# Patient Record
Sex: MALE | Race: WHITE | URBAN - METROPOLITAN AREA
[De-identification: names, ages, dates, MRNs, and addresses within clinical notes are randomized per-mention and may not be internally consistent; named-entity substitution may affect disease eponyms.]

---

## 2017-01-06 ENCOUNTER — OFFICE VISIT (OUTPATIENT)
Dept: FAMILY MEDICINE CLINIC | Age: 52
End: 2017-01-06

## 2017-01-06 VITALS — HEIGHT: 69 IN

## 2017-01-06 DIAGNOSIS — E29.1 HYPOGONADISM MALE: Primary | ICD-10-CM

## 2017-01-06 RX ORDER — TESTOSTERONE CYPIONATE 200 MG/ML
400 INJECTION INTRAMUSCULAR ONCE
Qty: 2 ML | Refills: 0
Start: 2017-01-06 | End: 2017-01-06

## 2017-01-06 NOTE — PROGRESS NOTES
Patient presents for Testosterone Injection 2 ml, 400 mg. Injection was given in Right Gluteus by Isatu Jones LPN and tolerated without difficulty. Patient advised to schedule next injection. Patient in agreement.

## 2017-02-03 ENCOUNTER — OFFICE VISIT (OUTPATIENT)
Dept: FAMILY MEDICINE CLINIC | Age: 52
End: 2017-02-03

## 2017-02-03 VITALS — HEIGHT: 69 IN

## 2017-02-03 DIAGNOSIS — E29.1 HYPOGONADISM MALE: Primary | ICD-10-CM

## 2017-02-03 RX ORDER — TESTOSTERONE CYPIONATE 200 MG/ML
400 INJECTION INTRAMUSCULAR ONCE
Qty: 2 ML | Refills: 0
Start: 2017-02-03 | End: 2017-02-03

## 2017-02-03 NOTE — PROGRESS NOTES
Patient presents for Testosterone Injection 2 ml, 400 mg. Injection was given in Left Gluteus by Woo Martienz LPN and tolerated without difficulty. Patient advised to schedule next injection. Patient in agreement.

## 2017-03-03 ENCOUNTER — CLINICAL SUPPORT (OUTPATIENT)
Dept: FAMILY MEDICINE CLINIC | Age: 52
End: 2017-03-03

## 2017-03-03 VITALS — HEIGHT: 69 IN

## 2017-03-03 DIAGNOSIS — E29.1 HYPOGONADISM MALE: Primary | ICD-10-CM

## 2017-03-03 RX ORDER — TESTOSTERONE CYPIONATE 200 MG/ML
400 INJECTION INTRAMUSCULAR ONCE
Qty: 2 ML | Refills: 0
Start: 2017-03-03 | End: 2017-03-03

## 2017-03-03 NOTE — PROGRESS NOTES
Patient presents for Testosterone Injection 2 ml, 400 mg. Injection was given in Right Gluteus by Benjamin Quintana LPN and tolerated without difficulty. Patient advised to schedule next injection. Patient in agreement.

## 2017-04-06 ENCOUNTER — CLINICAL SUPPORT (OUTPATIENT)
Dept: FAMILY MEDICINE CLINIC | Age: 52
End: 2017-04-06

## 2017-04-06 VITALS — HEIGHT: 69 IN

## 2017-04-06 DIAGNOSIS — E29.1 HYPOGONADISM MALE: Primary | ICD-10-CM

## 2017-04-06 RX ORDER — TESTOSTERONE CYPIONATE 200 MG/ML
400 INJECTION INTRAMUSCULAR ONCE
Qty: 2 ML | Refills: 0
Start: 2017-04-06 | End: 2017-04-06

## 2017-04-06 NOTE — PROGRESS NOTES
Patient presents for Testosterone Injection 2 ml, 400 mg. Injection was given in Right Gluteus by Pavel Colunga LPN and tolerated without difficulty. Patient advised to schedule next injection. Patient in agreement.

## 2017-04-11 RX ORDER — CANAGLIFLOZIN 300 MG/1
TABLET, FILM COATED ORAL
Qty: 30 TAB | Refills: 5 | Status: SHIPPED | OUTPATIENT
Start: 2017-04-11 | End: 2017-10-02 | Stop reason: SDUPTHER

## 2017-05-05 ENCOUNTER — CLINICAL SUPPORT (OUTPATIENT)
Dept: FAMILY MEDICINE CLINIC | Age: 52
End: 2017-05-05

## 2017-05-05 VITALS — HEIGHT: 69 IN

## 2017-05-05 DIAGNOSIS — E29.1 HYPOGONADISM MALE: Primary | ICD-10-CM

## 2017-05-05 RX ORDER — TESTOSTERONE CYPIONATE 200 MG/ML
400 INJECTION INTRAMUSCULAR ONCE
Qty: 2 ML | Refills: 0
Start: 2017-05-05 | End: 2017-05-05

## 2017-05-05 NOTE — PROGRESS NOTES
Patient presents for Testosterone Injection 2 ml, 400 mg. Injection was given in Left Gluteus by Henrry Richardson LPN and tolerated without difficulty. Patient advised to schedule next injection. Patient in agreement.

## 2017-06-02 ENCOUNTER — OFFICE VISIT (OUTPATIENT)
Dept: FAMILY MEDICINE CLINIC | Age: 52
End: 2017-06-02

## 2017-06-02 VITALS — HEIGHT: 69 IN

## 2017-06-02 DIAGNOSIS — E29.1 HYPOGONADISM MALE: Primary | ICD-10-CM

## 2017-06-02 RX ORDER — TESTOSTERONE CYPIONATE 200 MG/ML
400 INJECTION INTRAMUSCULAR ONCE
Qty: 2 ML | Refills: 0
Start: 2017-06-02 | End: 2017-06-02

## 2017-06-02 NOTE — PROGRESS NOTES
Patient presents for Testosterone Injection 2 ml, 400 mg. Injection was given in Right Gluteus by Ariel Burgess LPN and tolerated without difficulty. Patient advised to schedule next injection. Patient in agreement.

## 2017-07-03 RX ORDER — METFORMIN HYDROCHLORIDE 500 MG/1
TABLET, EXTENDED RELEASE ORAL
Qty: 60 TAB | Refills: 5 | Status: SHIPPED | OUTPATIENT
Start: 2017-07-03 | End: 2017-12-30 | Stop reason: SDUPTHER

## 2017-07-07 ENCOUNTER — OFFICE VISIT (OUTPATIENT)
Dept: FAMILY MEDICINE CLINIC | Age: 52
End: 2017-07-07

## 2017-07-07 VITALS — HEIGHT: 69 IN

## 2017-07-07 DIAGNOSIS — E29.1 HYPOGONADISM MALE: Primary | ICD-10-CM

## 2017-07-07 RX ORDER — TESTOSTERONE CYPIONATE 200 MG/ML
400 INJECTION INTRAMUSCULAR ONCE
Qty: 2 ML | Refills: 0
Start: 2017-07-07 | End: 2017-07-07

## 2017-07-07 NOTE — PROGRESS NOTES
Patient presents for Testosterone Injection 2 ml, 400 mg. Injection was given in Left Gluteus by Jeffrey Bustos LPN and tolerated without difficulty. Patient advised to schedule next injection. Patient in agreement.

## 2017-08-04 ENCOUNTER — OFFICE VISIT (OUTPATIENT)
Dept: FAMILY MEDICINE CLINIC | Age: 52
End: 2017-08-04

## 2017-08-04 VITALS
HEART RATE: 58 BPM | OXYGEN SATURATION: 98 % | HEIGHT: 69 IN | BODY MASS INDEX: 35.25 KG/M2 | SYSTOLIC BLOOD PRESSURE: 120 MMHG | DIASTOLIC BLOOD PRESSURE: 80 MMHG | RESPIRATION RATE: 16 BRPM | WEIGHT: 238 LBS | TEMPERATURE: 98 F

## 2017-08-04 DIAGNOSIS — E29.1 HYPOGONADISM MALE: ICD-10-CM

## 2017-08-04 DIAGNOSIS — E78.1 HYPERTRIGLYCERIDEMIA: ICD-10-CM

## 2017-08-04 DIAGNOSIS — E11.9 TYPE 2 DIABETES MELLITUS WITHOUT COMPLICATION, WITHOUT LONG-TERM CURRENT USE OF INSULIN (HCC): Primary | ICD-10-CM

## 2017-08-04 RX ORDER — TESTOSTERONE CYPIONATE 200 MG/ML
400 INJECTION INTRAMUSCULAR ONCE
Qty: 2 ML | Refills: 0
Start: 2017-08-04 | End: 2017-08-04

## 2017-08-04 RX ORDER — CLOBETASOL PROPIONATE 0.5 MG/G
CREAM TOPICAL 2 TIMES DAILY
Qty: 60 G | Refills: 5 | Status: SHIPPED | OUTPATIENT
Start: 2017-08-04 | End: 2018-08-14 | Stop reason: SDUPTHER

## 2017-08-04 NOTE — PROGRESS NOTES
1. Have you been to the ER, urgent care clinic since your last visit? Hospitalized since your last visit? No    2. Have you seen or consulted any other health care providers outside of the [address] [place] [address] since your last visit? Include any pap smears or colon screening. No    Chief Complaint   Patient presents with    Immunization/Injection     testosterone    Labs    Follow-up         Patient presents for Testosterone Injection 2 ml, 400 mg. Injection was given in Right Gluteus by Mabel Torres LPN and tolerated without difficulty. Patient advised to schedule next injection. Patient in agreement.

## 2017-08-04 NOTE — PROGRESS NOTES
HISTORY OF PRESENT ILLNESS  Chpee Ray is a 47 y.o. male. HPI  Cardiovascular Review:  The patient has hyperlipidemia. Diet and Lifestyle: generally follows a low fat low cholesterol diet, generally follows a low sodium diet, sedentary, nonsmoker  Home BP Monitoring: is not measured at home. Pertinent ROS: taking medications as instructed, no medication side effects noted, no TIA's, no chest pain on exertion, no dyspnea on exertion, no swelling of ankles. Diabetes Mellitus:  He has diabetes mellitus, and  hyperlipidemia. Diabetic ROS - medication compliance: compliant all of the time, diabetic diet compliance: compliant all of the time, home glucose monitoring: is not performed. Lab review: orders written for new lab studies as appropriate; see orders. Hypogonadism:  Due to 400mg IM shot today, also due for labs for testosterone        Patient Active Problem List    Diagnosis Date Noted    Diabetes mellitus ([facility] [address] 04/03/2012    Hypertriglyceridemia 04/03/2012    Finger(s) amputation status 05/13/2010    MOOK (obstructive sleep apnea) 05/13/2010    Hypogonadism male 05/13/2010     Current Outpatient Prescriptions   Medication Sig Dispense Refill    clobetasol (TEMOVATE) 0.05 % topical cream Apply  to affected area two (2) times a day. 60 g 5    testosterone cypionate (DEPOTESTOTERONE CYPIONATE) 200 mg/mL injection 2 mL by IntraMUSCular route once for 1 dose. 2 mL 0    metFORMIN ER (GLUCOPHAGE XR) 500 mg tablet TAKE 2 TABLETS BY MOUTH DAILY WITH DINNER 60 Tab 5    INVOKANA 300 mg tablet TAKE 1 TABLET BY MOUTH EVERY DAY 30 Tab 5    fenofibrate nanocrystallized (TRICOR) 145 mg tablet TAKE 1 TABLET BY MOUTH EVERY DAY 30 Tab 5    glucose blood VI test strips (ASCENSIA AUTODISC VI, ONE TOUCH ULTRA TEST VI) strip Bid monitoring dx: 250.00 1 Package 11    Lancets misc Bid monitoring dx: 250.00 1 Package 11    Blood-Glucose Meter (ONE TOUCH ULTRA SYSTEM KIT) monitoring kit by Does Not Apply route. Dx: 250.00   1 Kit 0     Family History   Problem Relation Age of Onset    No Known Problems Mother      Social History   Substance Use Topics    Smoking status: Former Smoker     Years: 10.00    Smokeless tobacco: Never Used    Alcohol use 0.0 oz/week     0 Standard drinks or equivalent per week      Comment: occas       ROS  A comprehensive review of system was obtained and negative except findings in the HPI    Visit Vitals    /80    Pulse (!) 58    Temp 98 °F (36.7 °C) (Oral)    Resp 16    Ht 5' 9\" (1.753 m)    Wt 238 lb (108 kg)    SpO2 98%    BMI 35.15 kg/m2     Physical Exam   Constitutional: He is oriented to person, place, and time. He appears well-developed and well-nourished. No distress. Cardiovascular: Normal rate and regular rhythm. No murmur heard. Pulmonary/Chest: Breath sounds normal. No respiratory distress. He has no wheezes. Musculoskeletal: He exhibits no edema. Neurological: He is alert and oriented to person, place, and time. Nursing note and vitals reviewed. ASSESSMENT and PLAN  Encounter Diagnoses   Name Primary?  Type 2 diabetes mellitus without complication, without long-term current use of insulin (HCC) Yes    Hypertriglyceridemia     Hypogonadism male      Orders Placed This Encounter    HEMOGLOBIN A1C WITH EAG    LIPID PANEL    TESTOSTERONE, FREE & TOTAL    clobetasol (TEMOVATE) 0.05 % topical cream    testosterone cypionate (DEPOTESTOTERONE CYPIONATE) 200 mg/mL injection     Labs and refills updated  Testosterone 400mg IM today  I have discussed the diagnosis with the patient and the intended plan as seen in the above orders. The patient has received an after-visit summary and questions were answered concerning future plans. Patient conveyed understanding of the plan at the time of the visit.     Meredith Isbell, MSN, ANP  8/4/2017

## 2017-08-04 NOTE — LETTER
8/4/2017 Mr. Chino Torres [address] [address] [place] [address] Patient is an active member of this practice. He is currently followed for Type II Diabetes, Hypertriglyceridemia, and Hypogonadism.   
 
 
 
Sincerely, 
 
 
Virgil Dave, NP

## 2017-08-04 NOTE — MR AVS SNAPSHOT
Visit Information Date & Time Provider Department Dept. Phone Encounter #  
 8/4/2017  8:45 AM Sarah Alegria, NP [address] [facility] [phone] [id number] Your Appointments 9/1/2017  7:45 AM  
ESTABLISHED PATIENT with Sarah Alegria, NP [address] [facility] ([address]) Appt Note: [id number] [place] [address] Road [address] [phone]  
  
   
 [address] [address] Upcoming Health Maintenance Date Due Hepatitis C Screening 1965 FOOT EXAM Q1 4/10/1974 EYE EXAM RETINAL OR DILATED Q1 4/10/1974 Pneumococcal 19-64 Medium Risk (1 of 1 - PPSV23) 4/10/1983 DTaP/Tdap/Td series (1 - Tdap) 4/10/1985 COLONOSCOPY 4/20/2015 HEMOGLOBIN A1C Q6M 6/2/2017 INFLUENZA AGE 9 TO ADULT 8/1/2017 MICROALBUMIN Q1 12/2/2017 LIPID PANEL Q1 12/2/2017 Allergies as of 8/4/2017  Review Complete On: 8/4/2017 By: Lj Yates LPN No Known Allergies Current Immunizations  Reviewed on 11/3/2015 Name Date Influenza Vaccine 12/13/2013 Influenza Vaccine (Quad) PF 11/4/2016, 11/3/2015, 1/2/2015 Influenza Vaccine Split 11/6/2012, 12/5/2011, 12/1/2010 Not reviewed this visit You Were Diagnosed With   
  
 Codes Comments Type 2 diabetes mellitus without complication, without long-term current use of insulin (HCC)    -  Primary ICD-10-CM: E11.9 ICD-9-CM: 250.00 Hypertriglyceridemia     ICD-10-CM: E78.1 ICD-9-CM: 272.1 Hypogonadism male     ICD-10-CM: E29.1 ICD-9-CM: 257.2 Vitals BP Pulse Temp Resp Height(growth percentile) Weight(growth percentile) 120/80 (!) 58 98 °F (36.7 °C) (Oral) 16 5' 9\" (1.753 m) 238 lb (108 kg) SpO2 BMI Smoking Status 98% 35.15 kg/m2 Former Smoker Vitals History BMI and BSA Data Body Mass Index Body Surface Area  
 35.15 kg/m 2 2.29 m 2 Preferred Pharmacy Pharmacy Name Phone [facility]/PHARMACY #[id number]- [address] [phone] Your Updated Medication List  
  
   
This list is accurate as of: 8/4/17  9:38 AM.  Always use your most recent med list.  
  
  
  
  
 Blood-Glucose Meter monitoring kit Commonly known as:  ONETOUCH ULTRA SYSTEM KIT  
by Does Not Apply route. Dx: 250.00  
  
 clobetasol 0.05 % topical cream  
Commonly known as:  Starling Coffee Apply  to affected area two (2) times a day. fenofibrate nanocrystallized 145 mg tablet Commonly known as:  TRICOR  
TAKE 1 TABLET BY MOUTH EVERY DAY  
  
 glucose blood VI test strips strip Commonly known as:  ASCENSIA AUTODISC VI, ONE TOUCH ULTRA TEST VI Bid monitoring dx: 250.00 INVOKANA 300 mg tablet Generic drug:  canagliflozin TAKE 1 TABLET BY MOUTH EVERY DAY Lancets Misc Bid monitoring dx: 250.00  
  
 metFORMIN  mg tablet Commonly known as:  GLUCOPHAGE XR  
TAKE 2 TABLETS BY MOUTH DAILY WITH DINNER  
  
 testosterone cypionate 200 mg/mL injection Commonly known as:  DEPOTESTOTERONE CYPIONATE 2 mL by IntraMUSCular route once for 1 dose. Prescriptions Sent to Pharmacy Refills  
 clobetasol (TEMOVATE) 0.05 % topical cream 5 Sig: Apply  to affected area two (2) times a day. Class: Normal  
 Pharmacy: [address] [place] [address], [address] [place] [address] Ph #: [phone] Route: Topical  
  
We Performed the Following HEMOGLOBIN A1C WITH EAG [87649 CPT(R)] LIPID PANEL [99247 CPT(R)] [place] INJ TESTOSTERONE CYPIONATE [ [facility]] [place] THER/PROPH/DIAG INJECTION, SUBCUT/IM [id number] CPT(R)] TESTOSTERONE, FREE & TOTAL [38457 CPT(R)] Introducing [facility]! Dear Masha Palenciar: 
Thank you for requesting a Ch[employer] account. Our records indicate that you already have an active [employer] account. You can access your account anytime at [email or web address] Did you know that you can access your hospital and ER discharge instructions at any time in OneBuild? You can also review all of your test results from your hospital stay or ER visit. Additional Information If you have questions, please visit the Frequently Asked Questions section of the OneBuild website at [email or web address] Remember, OneBuild is NOT to be used for urgent needs. For medical emergencies, dial 911. Now available from your iPhone and Android! Please provide this summary of care documentation to your next provider. Your primary care clinician is listed as Jess Alonso. If you have any questions after today's visit, please call [phone].

## 2017-08-05 LAB
CHOLEST SERPL-MCNC: 187 MG/DL (ref 100–199)
EST. AVERAGE GLUCOSE BLD GHB EST-MCNC: 163 MG/DL
HBA1C MFR BLD: 7.3 % (ref 4.8–5.6)
HDLC SERPL-MCNC: 31 MG/DL
INTERPRETATION, 910389: NORMAL
LDLC SERPL CALC-MCNC: 93 MG/DL (ref 0–99)
Lab: NORMAL
TESTOST FREE SERPL-MCNC: 6.1 PG/ML (ref 7.2–24)
TESTOST SERPL-MCNC: 173 NG/DL (ref 264–916)
TRIGL SERPL-MCNC: 313 MG/DL (ref 0–149)
VLDLC SERPL CALC-MCNC: 63 MG/DL (ref 5–40)

## 2017-08-06 NOTE — PROGRESS NOTES
Hey there, your labs for sugar are starting to climb again to make sure you are active and really watching the diet for carbs/sweets. Your testosterone level remains low but unfortunately this can see/saw in the shots. Your checkup is due anytime after 12/2/17, so we will do labs again then fasting.  Ricardo Joyner

## 2017-08-24 RX ORDER — FENOFIBRATE 145 MG/1
TABLET, COATED ORAL
Qty: 30 TAB | Refills: 1 | Status: SHIPPED | OUTPATIENT
Start: 2017-08-24 | End: 2017-10-22 | Stop reason: SDUPTHER

## 2017-09-01 ENCOUNTER — OFFICE VISIT (OUTPATIENT)
Dept: FAMILY MEDICINE CLINIC | Age: 52
End: 2017-09-01

## 2017-09-01 VITALS — HEIGHT: 69 IN

## 2017-09-01 DIAGNOSIS — E29.1 HYPOGONADISM MALE: Primary | ICD-10-CM

## 2017-09-01 RX ORDER — TESTOSTERONE CYPIONATE 200 MG/ML
200 INJECTION INTRAMUSCULAR ONCE
Qty: 2 ML | Refills: 0
Start: 2017-09-01 | End: 2017-09-01

## 2017-09-01 NOTE — PROGRESS NOTES
Patient presents for Testosterone Injection 2 ml, 400 mg. Injection was given in Left Gluteus by Baltazaralexandre Carranza LPN and tolerated without difficulty. Patient advised to schedule next injection. Patient in agreement.

## 2017-10-02 RX ORDER — CANAGLIFLOZIN 300 MG/1
TABLET, FILM COATED ORAL
Qty: 30 TAB | Refills: 5 | Status: SHIPPED | OUTPATIENT
Start: 2017-10-02 | End: 2018-04-15 | Stop reason: SDUPTHER

## 2017-10-06 ENCOUNTER — CLINICAL SUPPORT (OUTPATIENT)
Dept: FAMILY MEDICINE CLINIC | Age: 52
End: 2017-10-06

## 2017-10-06 DIAGNOSIS — Z23 ENCOUNTER FOR IMMUNIZATION: ICD-10-CM

## 2017-10-06 DIAGNOSIS — E29.1 HYPOGONADISM MALE: Primary | ICD-10-CM

## 2017-10-06 RX ORDER — TESTOSTERONE CYPIONATE 200 MG/ML
400 INJECTION INTRAMUSCULAR ONCE
Qty: 2 ML | Refills: 0
Start: 2017-10-06 | End: 2017-10-06

## 2017-10-22 RX ORDER — FENOFIBRATE 145 MG/1
TABLET, COATED ORAL
Qty: 30 TAB | Refills: 1 | Status: SHIPPED | OUTPATIENT
Start: 2017-10-22 | End: 2017-12-04 | Stop reason: SDUPTHER

## 2017-11-03 ENCOUNTER — CLINICAL SUPPORT (OUTPATIENT)
Dept: FAMILY MEDICINE CLINIC | Age: 52
End: 2017-11-03

## 2017-11-03 VITALS — HEIGHT: 69 IN

## 2017-11-03 DIAGNOSIS — E29.1 HYPOGONADISM MALE: Primary | ICD-10-CM

## 2017-11-03 RX ORDER — TESTOSTERONE CYPIONATE 200 MG/ML
400 INJECTION INTRAMUSCULAR ONCE
Qty: 2 ML | Refills: 0
Start: 2017-11-03 | End: 2017-11-03

## 2017-11-03 NOTE — PROGRESS NOTES
Patient presents for Testosterone Injection 2 ml, 400 mg. Injection was given in Left Gluteus by Nakia Roca LPN and tolerated without difficulty. Patient advised to schedule next injection. Patient in agreement.

## 2017-12-04 ENCOUNTER — OFFICE VISIT (OUTPATIENT)
Dept: FAMILY MEDICINE CLINIC | Age: 52
End: 2017-12-04

## 2017-12-04 VITALS
SYSTOLIC BLOOD PRESSURE: 110 MMHG | RESPIRATION RATE: 16 BRPM | HEIGHT: 69 IN | WEIGHT: 235.13 LBS | OXYGEN SATURATION: 98 % | DIASTOLIC BLOOD PRESSURE: 80 MMHG | HEART RATE: 68 BPM | BODY MASS INDEX: 34.82 KG/M2 | TEMPERATURE: 97.7 F

## 2017-12-04 DIAGNOSIS — Z12.5 SCREENING PSA (PROSTATE SPECIFIC ANTIGEN): ICD-10-CM

## 2017-12-04 DIAGNOSIS — Z00.00 WELL ADULT EXAM: Primary | ICD-10-CM

## 2017-12-04 DIAGNOSIS — E29.1 HYPOGONADISM MALE: ICD-10-CM

## 2017-12-04 DIAGNOSIS — E11.9 TYPE 2 DIABETES MELLITUS WITHOUT COMPLICATION, WITHOUT LONG-TERM CURRENT USE OF INSULIN (HCC): ICD-10-CM

## 2017-12-04 DIAGNOSIS — E78.1 HYPERTRIGLYCERIDEMIA: ICD-10-CM

## 2017-12-04 RX ORDER — FENOFIBRATE 145 MG/1
TABLET, COATED ORAL
Qty: 30 TAB | Refills: 5 | Status: SHIPPED | OUTPATIENT
Start: 2017-12-04 | End: 2018-06-29 | Stop reason: SDUPTHER

## 2017-12-04 RX ORDER — TESTOSTERONE CYPIONATE 200 MG/ML
400 INJECTION INTRAMUSCULAR ONCE
Qty: 2 ML | Refills: 0
Start: 2017-12-04 | End: 2017-12-04

## 2017-12-04 NOTE — PROGRESS NOTES
1. Have you been to the ER, urgent care clinic since your last visit? Hospitalized since your last visit? No    2. Have you seen or consulted any other health care providers outside of the [address] [place] [address] since your last visit? Include any pap smears or colon screening. No    Chief Complaint   Patient presents with    Immunization/Injection     testosterone    Follow-up     4 mo f/u    Labs     fasting         Patient presents for Testosterone Injection 2 ml, 200 mg. Injection was given in Right Gluteus by Courtney Cortez LPN and tolerated without difficulty. Patient advised to schedule next injection. Patient in agreement.

## 2017-12-04 NOTE — PROGRESS NOTES
Subjective:   Little Arce is a 47 y.o. male presenting for his annual checkup. ROS:  Feeling well. No dyspnea or chest pain on exertion. No abdominal pain, change in bowel habits, black or bloody stools. No urinary tract or prostatic symptoms. No neurological complaints. Specific concerns today: due for fasting labs, needs testosterone injection for the month. Patient Active Problem List    Diagnosis Date Noted    Diabetes mellitus ([address] 04/03/2012    Hypertriglyceridemia 04/03/2012    Finger(s) amputation status 05/13/2010    MOOK (obstructive sleep apnea) 05/13/2010    Hypogonadism male 05/13/2010     Current Outpatient Prescriptions   Medication Sig Dispense Refill    fenofibrate nanocrystallized (TRICOR) 145 mg tablet TAKE 1 TABLET BY MOUTH EVERY DAY 30 Tab 5    testosterone cypionate (DEPOTESTOTERONE CYPIONATE) 200 mg/mL injection 2 mL by IntraMUSCular route once for 1 dose. 2 mL 0    varicella zoster vaccine live (ZOSTAVAX) 19,400 unit/0.65 mL susr injection 1 Vial by SubCUTAneous route once for 1 dose. 0.65 mL 0    INVOKANA 300 mg tablet TAKE 1 TABLET BY MOUTH EVERY DAY 30 Tab 5    clobetasol (TEMOVATE) 0.05 % topical cream Apply  to affected area two (2) times a day. 60 g 5    metFORMIN ER (GLUCOPHAGE XR) 500 mg tablet TAKE 2 TABLETS BY MOUTH DAILY WITH DINNER 60 Tab 5    glucose blood VI test strips (ASCENSIA AUTODISC VI, ONE TOUCH ULTRA TEST VI) strip Bid monitoring dx: 250.00 1 Package 11    Lancets misc Bid monitoring dx: 250.00 1 Package 11    Blood-Glucose Meter (ONE TOUCH ULTRA SYSTEM KIT) monitoring kit by Does Not Apply route.  Dx: 250.00   1 Kit 0     Family History   Problem Relation Age of Onset    No Known Problems Mother      Social History   Substance Use Topics    Smoking status: Former Smoker     Years: 10.00    Smokeless tobacco: Never Used    Alcohol use 0.0 oz/week     0 Standard drinks or equivalent per week      Comment: occas             Objective: Visit Vitals    /80    Pulse 68    Temp 97.7 °F (36.5 °C) (Oral)    Resp 16    Ht 5' 9\" (1.753 m)    Wt 235 lb 2 oz (106.7 kg)    SpO2 98%    BMI 34.72 kg/m2     The patient appears well, alert, oriented x 3, in no distress. ENT normal.  Neck supple. No adenopathy or thyromegaly. DEVON. Lungs are clear, good air entry, no wheezes, rhonchi or rales. S1 and S2 normal, no murmurs, regular rate and rhythm. Abdomen is soft without tenderness, guarding, mass or organomegaly.  exam: HERNIA EXAM: no hernias found on exam.  Extremities show no edema, normal peripheral pulses. Neurological is normal without focal findings. Assessment/Plan:   healthy adult male  lose weight, increase physical activity, follow low fat diet, follow low salt diet, routine labs ordered. Encounter Diagnoses   Name Primary?  Well adult exam Yes    Type 2 diabetes mellitus without complication, without long-term current use of insulin (HCC)     Hypertriglyceridemia     Hypogonadism male     Screening PSA (prostate specific antigen)      Orders Placed This Encounter    CBC WITH AUTOMATED DIFF    LIPID PANEL    METABOLIC PANEL, COMPREHENSIVE    HEMOGLOBIN A1C WITH EAG    MICROALBUMIN, UR, RAND W/ MICROALBUMIN/CREA RATIO    TESTOSTERONE, FREE & TOTAL    PROSTATE SPECIFIC AG    fenofibrate nanocrystallized (TRICOR) 145 mg tablet    testosterone cypionate (DEPOTESTOTERONE CYPIONATE) 200 mg/mL injection    varicella zoster vaccine live (ZOSTAVAX) 19,400 unit/0.65 mL susr injection   . All labs updated today  rx for zoster given to price out at pharmacy  Testosterone injection 400mg IM given  Recheck 1 mo  I have discussed the diagnosis with the patient and the intended plan as seen in the above orders. The patient has received an after-visit summary and questions were answered concerning future plans. Patient conveyed understanding of the plan at the time of the visit.     Linn Steinerelor, MSN, ANP 12/4/2017

## 2017-12-04 NOTE — MR AVS SNAPSHOT
Visit Information Date & Time Provider Department Dept. Phone Encounter #  
 12/4/2017  9:30 AM Edwin Spivey NP [facility] [phone] [id number] Your Appointments 1/5/2018  8:15 AM  
ESTABLISHED PATIENT with Edwin Spivey NP [facility] ([facility]) Appt Note: injection [address] [address] [address] [id number] [phone]  
  
   
 [address] [address] [address] [address] Upcoming Health Maintenance Date Due Hepatitis C Screening 1965 FOOT EXAM Q1 4/10/1974 EYE EXAM RETINAL OR DILATED Q1 4/10/1974 Pneumococcal 19-64 Medium Risk (1 of 1 - PPSV23) 4/10/1983 DTaP/Tdap/Td series (1 - Tdap) 4/10/1985 COLONOSCOPY 4/20/2015 MICROALBUMIN Q1 12/2/2017 HEMOGLOBIN A1C Q6M 2/4/2018 LIPID PANEL Q1 8/4/2018 Allergies as of 12/4/2017  Review Complete On: 12/4/2017 By: Diane Veras LPN No Known Allergies Current Immunizations  Reviewed on 11/3/2015 Name Date Influenza Vaccine 12/13/2013 Influenza Vaccine Christopher Lobstein) 10/6/2017 Influenza Vaccine (Quad) PF 11/4/2016, 11/3/2015, 1/2/2015 Influenza Vaccine Split 11/6/2012, 12/5/2011, 12/1/2010 Not reviewed this visit You Were Diagnosed With   
  
 Codes Comments Well adult exam    -  Primary ICD-10-CM: Z00.00 ICD-9-CM: V70.0 Type 2 diabetes mellitus without complication, without long-term current use of insulin (HCC)     ICD-10-CM: E11.9 ICD-9-CM: 250.00 Hypertriglyceridemia     ICD-10-CM: E78.1 ICD-9-CM: 272.1 Hypogonadism male     ICD-10-CM: E29.1 ICD-9-CM: 257.2 Screening PSA (prostate specific antigen)     ICD-10-CM: Z12.5 ICD-9-CM: V76.44 Vitals BP Pulse Temp Resp Height(growth percentile) Weight(growth percentile) 110/80 68 97.7 °F (36.5 °C) (Oral) 16 5' 9\" (1.753 m) 235 lb 2 oz (106.7 kg) SpO2 BMI Smoking Status 98% 34.72 kg/m2 Former Smoker Vitals History BMI and BSA Data Body Mass Index Body Surface Area 34.72 kg/m 2 2.28 m 2 Preferred Pharmacy Pharmacy Name Phone [facility]/PHARMACY #[id number]- [id number] [phone] Your Updated Medication List  
  
   
This list is accurate as of: 17  9:50 AM.  Always use your most recent med list.  
  
  
  
  
 Blood-Glucose Meter monitoring kit Commonly known as:  ONETOUCH ULTRA SYSTEM KIT  
by Does Not Apply route. Dx: 250.00  
  
 clobetasol 0.05 % topical cream  
Commonly known as:  Izetta Gonzalez Apply  to affected area two (2) times a day. fenofibrate nanocrystallized 145 mg tablet Commonly known as:  TRICOR  
TAKE 1 TABLET BY MOUTH EVERY DAY  
  
 glucose blood VI test strips strip Commonly known as:  ASCENSIA AUTODISC VI, ONE TOUCH ULTRA TEST VI Bid monitoring dx: 250.00 INVOKANA 300 mg tablet Generic drug:  canagliflozin TAKE 1 TABLET BY MOUTH EVERY DAY Lancets Misc Bid monitoring dx: 250.00  
  
 metFORMIN  mg tablet Commonly known as:  GLUCOPHAGE XR  
TAKE 2 TABLETS BY MOUTH DAILY WITH DINNER  
  
 testosterone cypionate 200 mg/mL injection Commonly known as:  DEPOTESTOTERONE CYPIONATE 2 mL by IntraMUSCular route once for 1 dose. varicella zoster vaccine live 19,400 unit/0.65 mL Susr injection Commonly known as:  ZOSTAVAX  
1 Vial by SubCUTAneous route once for 1 dose. Prescriptions Printed Refills  
 varicella zoster vaccine live (ZOSTAVAX) 19,400 unit/0.65 mL susr injection 0 Si Vial by SubCUTAneous route once for 1 dose. Class: Print Route: SubCUTAneous Prescriptions Sent to Pharmacy Refills  
 fenofibrate nanocrystallized (TRICOR) 145 mg tablet 5 Sig: TAKE 1 TABLET BY MOUTH EVERY DAY Class: Normal  
 Pharmacy: [facility], [address] [place] [address] Ph #: [phone] We Performed the Following CBC WITH AUTOMATED DIFF [71546 CPT(R)] HEMOGLOBIN A1C WITH EAG [71372 CPT(R)] LIPID PANEL [73473 CPT(R)] METABOLIC PANEL, COMPREHENSIVE [14444 CPT(R)] MICROALBUMIN, UR, RAND W/ MICROALBUMIN/CREA RATIO [id number] CPT(R)] SD INJ TESTOSTERONE CYPIONATE [ [facility]] [place] THER/PROPH/DIAG INJECTION, SUBCUT/IM [id number] CPT(R)] PSA, DIAGNOSTIC (PROSTATE SPECIFIC AG) [id number] CPT(R)] TESTOSTERONE, FREE & TOTAL [25025 CPT(R)] Introducing [facility]! Dear Ishaan Party: 
Thank you for requesting a [employer] account. Our records indicate that you already have an active [employer] account. You can access your account anytime at [email or web address] Did you know that you can access your hospital and ER discharge instructions at any time in [employer]? You can also review all of your test results from your hospital stay or ER visit. Additional Information If you have questions, please visit the Frequently Asked Questions section of the [employer] website at [email or web address] Remember, [employer] is NOT to be used for urgent needs. For medical emergencies, dial 911. Now available from your iPhone and Android! Please provide this summary of care documentation to your next provider. Your primary care clinician is listed as Zay Bettencourt. If you have any questions after today's visit, please call [phone].

## 2017-12-05 LAB
ALBUMIN SERPL-MCNC: 4.5 G/DL (ref 3.5–5.5)
ALBUMIN/CREAT UR: 6.3 MG/G CREAT (ref 0–30)
ALBUMIN/GLOB SERPL: 1.4 {RATIO} (ref 1.2–2.2)
ALP SERPL-CCNC: 102 IU/L (ref 39–117)
ALT SERPL-CCNC: 31 IU/L (ref 0–44)
AST SERPL-CCNC: 27 IU/L (ref 0–40)
BASOPHILS # BLD AUTO: 0 X10E3/UL (ref 0–0.2)
BASOPHILS NFR BLD AUTO: 1 %
BILIRUB SERPL-MCNC: 0.4 MG/DL (ref 0–1.2)
BUN SERPL-MCNC: 14 MG/DL (ref 6–24)
BUN/CREAT SERPL: 14 (ref 9–20)
CALCIUM SERPL-MCNC: 9.5 MG/DL (ref 8.7–10.2)
CHLORIDE SERPL-SCNC: 99 MMOL/L (ref 96–106)
CHOLEST SERPL-MCNC: 215 MG/DL (ref 100–199)
CO2 SERPL-SCNC: 26 MMOL/L (ref 18–29)
CREAT SERPL-MCNC: 0.99 MG/DL (ref 0.76–1.27)
CREAT UR-MCNC: 70.9 MG/DL
EOSINOPHIL # BLD AUTO: 0.1 X10E3/UL (ref 0–0.4)
EOSINOPHIL NFR BLD AUTO: 3 %
ERYTHROCYTE [DISTWIDTH] IN BLOOD BY AUTOMATED COUNT: 13.6 % (ref 12.3–15.4)
EST. AVERAGE GLUCOSE BLD GHB EST-MCNC: 186 MG/DL
GFR SERPLBLD CREATININE-BSD FMLA CKD-EPI: 100 ML/MIN/1.73
GFR SERPLBLD CREATININE-BSD FMLA CKD-EPI: 87 ML/MIN/1.73
GLOBULIN SER CALC-MCNC: 3.2 G/DL (ref 1.5–4.5)
GLUCOSE SERPL-MCNC: 138 MG/DL (ref 65–99)
HBA1C MFR BLD: 8.1 % (ref 4.8–5.6)
HCT VFR BLD AUTO: 51 % (ref 37.5–51)
HDLC SERPL-MCNC: 38 MG/DL
HGB BLD-MCNC: 17.5 G/DL (ref 13–17.7)
IMM GRANULOCYTES # BLD: 0 X10E3/UL (ref 0–0.1)
IMM GRANULOCYTES NFR BLD: 1 %
INTERPRETATION, 910389: NORMAL
LDLC SERPL CALC-MCNC: 132 MG/DL (ref 0–99)
LYMPHOCYTES # BLD AUTO: 1.4 X10E3/UL (ref 0.7–3.1)
LYMPHOCYTES NFR BLD AUTO: 29 %
Lab: NORMAL
MCH RBC QN AUTO: 30.5 PG (ref 26.6–33)
MCHC RBC AUTO-ENTMCNC: 34.3 G/DL (ref 31.5–35.7)
MCV RBC AUTO: 89 FL (ref 79–97)
MICROALBUMIN UR-MCNC: 4.5 UG/ML
MONOCYTES # BLD AUTO: 0.5 X10E3/UL (ref 0.1–0.9)
MONOCYTES NFR BLD AUTO: 10 %
NEUTROPHILS # BLD AUTO: 2.8 X10E3/UL (ref 1.4–7)
NEUTROPHILS NFR BLD AUTO: 56 %
PLATELET # BLD AUTO: 218 X10E3/UL (ref 150–379)
POTASSIUM SERPL-SCNC: 4.8 MMOL/L (ref 3.5–5.2)
PROT SERPL-MCNC: 7.7 G/DL (ref 6–8.5)
PSA SERPL-MCNC: 0.7 NG/ML (ref 0–4)
RBC # BLD AUTO: 5.74 X10E6/UL (ref 4.14–5.8)
SODIUM SERPL-SCNC: 140 MMOL/L (ref 134–144)
TESTOST FREE SERPL-MCNC: 6.9 PG/ML (ref 7.2–24)
TESTOST SERPL-MCNC: 126 NG/DL (ref 264–916)
TRIGL SERPL-MCNC: 224 MG/DL (ref 0–149)
VLDLC SERPL CALC-MCNC: 45 MG/DL (ref 5–40)
WBC # BLD AUTO: 4.9 X10E3/UL (ref 3.4–10.8)

## 2017-12-11 NOTE — PROGRESS NOTES
Both your sugar and cholesterol have gone sharita high again, are you taking your meds?  Deo Ellsworth

## 2018-01-02 RX ORDER — METFORMIN HYDROCHLORIDE 500 MG/1
TABLET, EXTENDED RELEASE ORAL
Qty: 60 TAB | Refills: 5 | Status: SHIPPED | OUTPATIENT
Start: 2018-01-02 | End: 2018-06-29 | Stop reason: SDUPTHER

## 2018-01-05 ENCOUNTER — OFFICE VISIT (OUTPATIENT)
Dept: FAMILY MEDICINE CLINIC | Age: 53
End: 2018-01-05

## 2018-01-05 VITALS — HEIGHT: 69 IN

## 2018-01-05 DIAGNOSIS — E29.1 HYPOGONADISM MALE: Primary | ICD-10-CM

## 2018-01-05 RX ORDER — TESTOSTERONE CYPIONATE 200 MG/ML
400 INJECTION INTRAMUSCULAR ONCE
Qty: 2 ML | Refills: 0
Start: 2018-01-05 | End: 2018-01-05

## 2018-01-05 NOTE — PROGRESS NOTES
Patient presents for Testosterone Injection 2 ml, 400 mg. Injection was given in Left Gluteus by Shahnaz Castro LPN and tolerated without difficulty. Patient advised to schedule next injection. Patient in agreement.

## 2018-02-02 ENCOUNTER — OFFICE VISIT (OUTPATIENT)
Dept: FAMILY MEDICINE CLINIC | Age: 53
End: 2018-02-02

## 2018-02-02 VITALS — HEIGHT: 69 IN

## 2018-02-02 DIAGNOSIS — E29.1 HYPOGONADISM MALE: Primary | ICD-10-CM

## 2018-02-02 RX ORDER — TESTOSTERONE CYPIONATE 200 MG/ML
400 INJECTION INTRAMUSCULAR ONCE
Qty: 2 ML | Refills: 0
Start: 2018-02-02 | End: 2018-02-02

## 2018-02-02 NOTE — PROGRESS NOTES
Patient presents for Testosterone Injection 2 ml, 400 mg. Injection was given in Right Gluteus by Clau Carrillo LPN and tolerated without difficulty. Patient advised to schedule next injection. Patient in agreement.

## 2018-03-02 ENCOUNTER — OFFICE VISIT (OUTPATIENT)
Dept: FAMILY MEDICINE CLINIC | Age: 53
End: 2018-03-02

## 2018-03-02 DIAGNOSIS — E29.1 HYPOGONADISM MALE: Primary | ICD-10-CM

## 2018-03-02 RX ORDER — TESTOSTERONE CYPIONATE 200 MG/ML
400 INJECTION INTRAMUSCULAR ONCE
Qty: 2 ML | Refills: 0
Start: 2018-03-02 | End: 2018-03-02

## 2018-03-02 RX ORDER — TESTOSTERONE CYPIONATE 200 MG/ML
400 INJECTION INTRAMUSCULAR ONCE
Qty: 2 ML | Refills: 0 | Status: SHIPPED | OUTPATIENT
Start: 2018-03-02 | End: 2018-03-02 | Stop reason: CLARIF

## 2018-04-04 ENCOUNTER — OFFICE VISIT (OUTPATIENT)
Dept: FAMILY MEDICINE CLINIC | Age: 53
End: 2018-04-04

## 2018-04-04 VITALS
RESPIRATION RATE: 19 BRPM | WEIGHT: 231.2 LBS | SYSTOLIC BLOOD PRESSURE: 122 MMHG | TEMPERATURE: 99 F | DIASTOLIC BLOOD PRESSURE: 80 MMHG | HEIGHT: 69 IN | BODY MASS INDEX: 34.24 KG/M2 | HEART RATE: 87 BPM | OXYGEN SATURATION: 97 %

## 2018-04-04 DIAGNOSIS — E29.1 HYPOGONADISM MALE: Primary | ICD-10-CM

## 2018-04-04 RX ORDER — TESTOSTERONE CYPIONATE 200 MG/ML
400 INJECTION INTRAMUSCULAR ONCE
Qty: 2 ML | Refills: 0
Start: 2018-04-04 | End: 2018-04-04

## 2018-04-04 NOTE — PROGRESS NOTES
Chief Complaint   Patient presents with    Immunization/Injection     Testosterone     Pt seen in the office today for testosterone injection   Administer 200 mg/mL of testosterone cypionate. After obtaining verbal consent from the patient, 2 ml of testosterone  injection was administered to left gluteus by IAN White 47: [id number], Lot: [id number] Exp:06/20  Man[employer]: [employer].  Patient tolerated procedure well.

## 2018-04-15 RX ORDER — CANAGLIFLOZIN 300 MG/1
TABLET, FILM COATED ORAL
Qty: 30 TAB | Refills: 5 | Status: SHIPPED | OUTPATIENT
Start: 2018-04-15 | End: 2018-10-14 | Stop reason: SDUPTHER

## 2018-05-04 ENCOUNTER — CLINICAL SUPPORT (OUTPATIENT)
Dept: FAMILY MEDICINE CLINIC | Age: 53
End: 2018-05-04

## 2018-05-04 VITALS — HEIGHT: 69 IN

## 2018-05-04 DIAGNOSIS — E29.1 HYPOGONADISM MALE: Primary | ICD-10-CM

## 2018-05-04 RX ORDER — TESTOSTERONE CYPIONATE 200 MG/ML
400 INJECTION INTRAMUSCULAR ONCE
Qty: 2 ML | Refills: 0
Start: 2018-05-04 | End: 2018-05-04

## 2018-05-04 NOTE — PROGRESS NOTES
Patient presents for Testosterone Injection 2 ml, 400 mg. Injection was given in Right Gluteus by Sharita Landaverde LPN and tolerated without difficulty. Patient advised to schedule next injection. Patient in agreement.

## 2018-06-06 ENCOUNTER — OFFICE VISIT (OUTPATIENT)
Dept: FAMILY MEDICINE CLINIC | Age: 53
End: 2018-06-06

## 2018-06-06 VITALS — HEIGHT: 69 IN

## 2018-06-06 DIAGNOSIS — E29.1 HYPOGONADISM MALE: Primary | ICD-10-CM

## 2018-06-06 RX ORDER — TESTOSTERONE CYPIONATE 200 MG/ML
400 INJECTION INTRAMUSCULAR ONCE
Qty: 2 ML | Refills: 0
Start: 2018-06-06 | End: 2018-06-06

## 2018-06-06 NOTE — PROGRESS NOTES
Patient presents for Testosterone Injection 2 ml, 400 mg. Injection was given in Left Deltoid by Katlyn Aleman LPN and tolerated without difficulty. Patient advised to schedule next injection. Patient in agreement.

## 2018-06-29 RX ORDER — FENOFIBRATE 145 MG/1
TABLET, COATED ORAL
Qty: 30 TAB | Refills: 5 | Status: SHIPPED | OUTPATIENT
Start: 2018-06-29 | End: 2018-12-14 | Stop reason: SDUPTHER

## 2018-06-29 RX ORDER — METFORMIN HYDROCHLORIDE 500 MG/1
TABLET, EXTENDED RELEASE ORAL
Qty: 60 TAB | Refills: 5 | Status: SHIPPED | OUTPATIENT
Start: 2018-06-29 | End: 2018-12-14 | Stop reason: SDUPTHER

## 2018-07-06 ENCOUNTER — CLINICAL SUPPORT (OUTPATIENT)
Dept: FAMILY MEDICINE CLINIC | Age: 53
End: 2018-07-06

## 2018-07-06 VITALS — HEIGHT: 69 IN

## 2018-07-06 DIAGNOSIS — E29.1 HYPOGONADISM MALE: Primary | ICD-10-CM

## 2018-07-06 RX ORDER — TESTOSTERONE CYPIONATE 200 MG/ML
400 INJECTION INTRAMUSCULAR ONCE
Qty: 2 ML | Refills: 0
Start: 2018-07-06 | End: 2018-07-06

## 2018-07-06 NOTE — PROGRESS NOTES
Patient presents for Testosterone Injection 2 ml, 400 mg. Injection was given in Right Gluteus by Theo Ely LPN and tolerated without difficulty. Patient advised to schedule next injection. Patient in agreement.

## 2018-08-03 ENCOUNTER — CLINICAL SUPPORT (OUTPATIENT)
Dept: FAMILY MEDICINE CLINIC | Age: 53
End: 2018-08-03

## 2018-08-03 VITALS
BODY MASS INDEX: 34.07 KG/M2 | TEMPERATURE: 97.7 F | DIASTOLIC BLOOD PRESSURE: 79 MMHG | RESPIRATION RATE: 14 BRPM | SYSTOLIC BLOOD PRESSURE: 122 MMHG | WEIGHT: 230 LBS | HEART RATE: 59 BPM | HEIGHT: 69 IN | OXYGEN SATURATION: 96 %

## 2018-08-03 DIAGNOSIS — E29.1 HYPOGONADISM MALE: Primary | ICD-10-CM

## 2018-08-03 RX ORDER — TESTOSTERONE CYPIONATE 200 MG/ML
400 INJECTION INTRAMUSCULAR ONCE
Qty: 2 ML | Refills: 0
Start: 2018-08-03 | End: 2018-08-03

## 2018-08-03 NOTE — PROGRESS NOTES
Chief Complaint   Patient presents with    Injection     1. Have you been to the ER, urgent care clinic since your last visit? Hospitalized since your last visit? No    2. Have you seen or consulted any other health care providers outside of the [address] [place] [address] since your last visit? Include any pap smears or colon screening.  No

## 2018-08-03 NOTE — PROGRESS NOTES
400mg of testosterone given in (R) glut, injection well tolerated. Pt to return in 1 months.    Injection given by Nito Renae LPN

## 2018-08-15 RX ORDER — CLOBETASOL PROPIONATE 0.5 MG/G
CREAM TOPICAL
Qty: 60 G | Refills: 0 | Status: SHIPPED | OUTPATIENT
Start: 2018-08-15 | End: 2022-01-25 | Stop reason: SDUPTHER

## 2018-09-07 ENCOUNTER — CLINICAL SUPPORT (OUTPATIENT)
Dept: FAMILY MEDICINE CLINIC | Age: 53
End: 2018-09-07

## 2018-09-07 DIAGNOSIS — E29.1 HYPOGONADISM MALE: Primary | ICD-10-CM

## 2018-09-07 RX ORDER — TESTOSTERONE CYPIONATE 200 MG/ML
400 INJECTION INTRAMUSCULAR ONCE
Qty: 2 ML | Refills: 0
Start: 2018-09-07 | End: 2018-09-07

## 2018-09-07 NOTE — PROGRESS NOTES
400mg of testosterone given in (L) glut, injection well tolerated. Pt to return in 1 months. After obtaining consent, and per orders of GLORIA Jacob, injection of 400mg of testosterone given in (L) glut by Val Carrillo LPN. Patient instructed to remain in clinic for 20 minutes afterwards, and to report any adverse reaction to me immediately.

## 2018-10-05 ENCOUNTER — CLINICAL SUPPORT (OUTPATIENT)
Dept: FAMILY MEDICINE CLINIC | Age: 53
End: 2018-10-05

## 2018-10-05 DIAGNOSIS — E29.1 HYPOGONADISM MALE: Primary | ICD-10-CM

## 2018-10-05 RX ORDER — TESTOSTERONE CYPIONATE 200 MG/ML
400 INJECTION INTRAMUSCULAR ONCE
Qty: 2 ML | Refills: 0
Start: 2018-10-05 | End: 2018-10-05

## 2018-10-05 NOTE — PROGRESS NOTES
After obtaining consent, and per orders of Naveen Cage, injection of 400mg Testosterone given by Marissa Herring LPN in (R) Glut. Patient instructed to remain in clinic for 20 minutes afterwards, and to report any adverse reaction to me immediately. Injection well tolerated. Pt to return in 1 months.

## 2018-10-14 RX ORDER — CANAGLIFLOZIN 300 MG/1
TABLET, FILM COATED ORAL
Qty: 30 TAB | Refills: 5 | Status: SHIPPED | OUTPATIENT
Start: 2018-10-14 | End: 2019-04-22 | Stop reason: SDUPTHER

## 2018-11-02 ENCOUNTER — OFFICE VISIT (OUTPATIENT)
Dept: FAMILY MEDICINE CLINIC | Age: 53
End: 2018-11-02

## 2018-11-02 DIAGNOSIS — Z23 ENCOUNTER FOR IMMUNIZATION: ICD-10-CM

## 2018-11-02 DIAGNOSIS — E29.1 HYPOGONADISM MALE: Primary | ICD-10-CM

## 2018-11-02 RX ORDER — TESTOSTERONE CYPIONATE 200 MG/ML
400 INJECTION INTRAMUSCULAR ONCE
Qty: 2 ML | Refills: 0
Start: 2018-11-02 | End: 2018-11-02

## 2018-11-02 NOTE — PROGRESS NOTES
Chief Complaint   Patient presents with    Immunization/Injection     TESTOSTERONE      After obtaining consent, and per orders of Soraida Delay, injection of Flulaval given by Harish Myrick LPN in (R) delt. Patient instructed to remain in clinic for 20 minutes afterwards, and to report any adverse reaction to me immediately. Injection well tolerated. After obtaining consent, and per orders of Soraida Delay, injection of 400mg Testosterone Cypionate given by Harish Myrick LPN in (L) glut. Patient instructed to remain in clinic for 20 minutes afterwards, and to report any adverse reaction to me immediately. Injection well tolerated. Pt to return in 1 month.

## 2018-12-07 ENCOUNTER — CLINICAL SUPPORT (OUTPATIENT)
Dept: FAMILY MEDICINE CLINIC | Age: 53
End: 2018-12-07

## 2018-12-07 DIAGNOSIS — E29.1 HYPOGONADISM MALE: Primary | ICD-10-CM

## 2018-12-07 RX ORDER — TESTOSTERONE CYPIONATE 200 MG/ML
400 INJECTION INTRAMUSCULAR ONCE
Qty: 2 ML | Refills: 0
Start: 2018-12-07 | End: 2018-12-07

## 2018-12-07 NOTE — PROGRESS NOTES
After obtaining consent, and per orders of Soraida Jacobs, injection of Testosterone Cypionate given by Harish Myrick LPN in (L) glut. Patient instructed to remain in clinic for 20 minutes afterwards, and to report any adverse reaction to me immediately. Injection well tolerated. Pt to return in 1 month.

## 2018-12-14 RX ORDER — METFORMIN HYDROCHLORIDE 500 MG/1
TABLET, EXTENDED RELEASE ORAL
Qty: 60 TAB | Refills: 5 | Status: SHIPPED | OUTPATIENT
Start: 2018-12-14 | End: 2019-05-11 | Stop reason: SDUPTHER

## 2018-12-14 RX ORDER — FENOFIBRATE 145 MG/1
TABLET, COATED ORAL
Qty: 30 TAB | Refills: 5 | Status: SHIPPED | OUTPATIENT
Start: 2018-12-14 | End: 2019-04-20 | Stop reason: SDUPTHER

## 2019-01-04 ENCOUNTER — OFFICE VISIT (OUTPATIENT)
Dept: FAMILY MEDICINE CLINIC | Age: 54
End: 2019-01-04

## 2019-01-04 DIAGNOSIS — E29.1 HYPOGONADISM MALE: Primary | ICD-10-CM

## 2019-01-04 RX ORDER — TESTOSTERONE CYPIONATE 200 MG/ML
400 INJECTION INTRAMUSCULAR ONCE
Qty: 2 ML | Refills: 0
Start: 2019-01-04 | End: 2019-01-04

## 2019-01-04 NOTE — PROGRESS NOTES
Chief Complaint   Patient presents with    Immunization/Injection     Testosterone        After obtaining consent, and per orders of Raheel Phan, injection of Testosterone Cypionate given by Isacc Duran LPN in (R) glut. Patient instructed to remain in clinic for 20 minutes afterwards, and to report any adverse reaction to me immediately. Injection well tolerated. Pt to return in 1 month.

## 2019-02-01 ENCOUNTER — OFFICE VISIT (OUTPATIENT)
Dept: FAMILY MEDICINE CLINIC | Age: 54
End: 2019-02-01

## 2019-02-01 DIAGNOSIS — E29.1 HYPOGONADISM MALE: Primary | ICD-10-CM

## 2019-02-01 RX ORDER — TESTOSTERONE CYPIONATE 200 MG/ML
400 INJECTION INTRAMUSCULAR ONCE
Qty: 2 ML | Refills: 0
Start: 2019-02-01 | End: 2019-02-01

## 2019-02-01 NOTE — PROGRESS NOTES
Chief Complaint   Patient presents with    Immunization/Injection     testosterone injection     Patient presents in office today for testosterone injection only. Given in left gluteus. Tolerated well.

## 2019-03-01 ENCOUNTER — OFFICE VISIT (OUTPATIENT)
Dept: FAMILY MEDICINE CLINIC | Age: 54
End: 2019-03-01

## 2019-03-01 DIAGNOSIS — Z00.00 WELL ADULT EXAM: Primary | ICD-10-CM

## 2019-03-01 DIAGNOSIS — E29.1 HYPOGONADISM MALE: ICD-10-CM

## 2019-03-01 DIAGNOSIS — E78.1 HYPERTRIGLYCERIDEMIA: ICD-10-CM

## 2019-03-01 DIAGNOSIS — E11.9 TYPE 2 DIABETES MELLITUS WITHOUT COMPLICATION, WITHOUT LONG-TERM CURRENT USE OF INSULIN (HCC): ICD-10-CM

## 2019-03-01 RX ORDER — TESTOSTERONE CYPIONATE 200 MG/ML
200 INJECTION INTRAMUSCULAR ONCE
Qty: 2 ML | Refills: 0
Start: 2019-03-01 | End: 2019-03-01

## 2019-03-01 NOTE — PROGRESS NOTES
Subjective:   Lucas Warren is a 46 y.o. male presenting for his annual checkup. ROS:  Feeling well. No dyspnea or chest pain on exertion. No abdominal pain, change in bowel habits, black or bloody stools. No urinary tract or prostatic symptoms. No neurological complaints. Specific concerns today: he is overdue for his testosterone and wellness labs; did fast today, no refills needed, he does not check his own blood sugars, he is also due for his 400mg IM testosterone injection today as well. Patient Active Problem List    Diagnosis Date Noted    Diabetes mellitus ([facility] [address] 04/03/2012    Hypertriglyceridemia 04/03/2012    Finger(s) amputation status 05/13/2010    MOOK (obstructive sleep apnea) 05/13/2010    Hypogonadism male 05/13/2010     Current Outpatient Medications   Medication Sig Dispense Refill    testosterone cypionate (DEPOTESTOTERONE CYPIONATE) 200 mg/mL injection 1 mL by IntraMUSCular route once for 1 dose. 2 mL 0    fenofibrate nanocrystallized (TRICOR) 145 mg tablet TAKE 1 TABLET BY MOUTH EVERY DAY 30 Tab 5    metFORMIN ER (GLUCOPHAGE XR) 500 mg tablet TAKE 2 TABLETS BY MOUTH DAILY WITH DINNER 60 Tab 5    INVOKANA 300 mg tablet TAKE 1 TABLET BY MOUTH EVERY DAY 30 Tab 5    clobetasol (TEMOVATE) 0.05 % topical cream APPLY TOPICALLY TO AFFECTED AREA TWO TIMES A DAY 60 g 0    glucose blood VI test strips (ASCENSIA AUTODISC VI, ONE TOUCH ULTRA TEST VI) strip Bid monitoring dx: 250.00 1 Package 11    Lancets misc Bid monitoring dx: 250.00 1 Package 11    Blood-Glucose Meter (ONE TOUCH ULTRA SYSTEM KIT) monitoring kit by Does Not Apply route. Dx: 250.00   1 Kit 0     Family History   Problem Relation Age of Onset    No Known Problems Mother      Social History     Tobacco Use    Smoking status: Former Smoker     Years: 10.00    Smokeless tobacco: Never Used   Substance Use Topics    Alcohol use: Yes     Alcohol/week: 0.0 oz     Comment: occas         Objective:      There were no vitals taken for this visit. The patient appears well, alert, oriented x 3, in no distress. ENT normal.  Neck supple. No adenopathy or thyromegaly. DEVON. Lungs are clear, good air entry, no wheezes, rhonchi or rales. S1 and S2 normal, no murmurs, regular rate and rhythm. Abdomen is soft without tenderness, guarding, mass or organomegaly.  exam: deferred. Extremities show no edema, normal peripheral pulses. Neurological is normal without focal findings. Assessment/Plan:   healthy adult male  lose weight, increase physical activity, follow low fat diet, follow low salt diet, routine labs ordered, encouraged blood sugar monitoring, dev plan with labs  . Encounter Diagnoses   Name Primary?  Well adult exam Yes    Hypogonadism male     Type 2 diabetes mellitus without complication, without long-term current use of insulin (HCC)     Hypertriglyceridemia      Orders Placed This Encounter    LIPID PANEL    METABOLIC PANEL, COMPREHENSIVE    PROSTATE SPECIFIC AG    CBC WITH AUTOMATED DIFF    HEMOGLOBIN A1C WITH EAG    TSH 3RD GENERATION    TESTOSTERONE, FREE & TOTAL    testosterone cypionate (DEPOTESTOTERONE CYPIONATE) 200 mg/mL injection   . 400mg IM testosterone given today as well after labs  Recheck 1mo and pending lab results    I have discussed the diagnosis with the patient and the intended plan as seen in the above orders. The patient has received an after-visit summary and questions were answered concerning future plans. Patient conveyed understanding of the plan at the time of the visit.     Benjamin Sargent, MSN, ANP  3/1/2019

## 2019-03-01 NOTE — PROGRESS NOTES
Chief Complaint   Patient presents with    Injection    Labs     Pt in office today for testosterone injection and labs    Pt has no other concerns

## 2019-03-02 LAB
ALBUMIN SERPL-MCNC: 4.4 G/DL (ref 3.5–5.5)
ALBUMIN/GLOB SERPL: 1.4 {RATIO} (ref 1.2–2.2)
ALP SERPL-CCNC: 97 IU/L (ref 39–117)
ALT SERPL-CCNC: 25 IU/L (ref 0–44)
AST SERPL-CCNC: 24 IU/L (ref 0–40)
BASOPHILS # BLD AUTO: 0 X10E3/UL (ref 0–0.2)
BASOPHILS NFR BLD AUTO: 1 %
BILIRUB SERPL-MCNC: 0.4 MG/DL (ref 0–1.2)
BUN SERPL-MCNC: 12 MG/DL (ref 6–24)
BUN/CREAT SERPL: 11 (ref 9–20)
CALCIUM SERPL-MCNC: 9.4 MG/DL (ref 8.7–10.2)
CHLORIDE SERPL-SCNC: 100 MMOL/L (ref 96–106)
CHOLEST SERPL-MCNC: 198 MG/DL (ref 100–199)
CO2 SERPL-SCNC: 24 MMOL/L (ref 20–29)
CREAT SERPL-MCNC: 1.06 MG/DL (ref 0.76–1.27)
EOSINOPHIL # BLD AUTO: 0.2 X10E3/UL (ref 0–0.4)
EOSINOPHIL NFR BLD AUTO: 3 %
ERYTHROCYTE [DISTWIDTH] IN BLOOD BY AUTOMATED COUNT: 13.7 % (ref 12.3–15.4)
EST. AVERAGE GLUCOSE BLD GHB EST-MCNC: 174 MG/DL
GLOBULIN SER CALC-MCNC: 3.2 G/DL (ref 1.5–4.5)
GLUCOSE SERPL-MCNC: 156 MG/DL (ref 65–99)
HBA1C MFR BLD: 7.7 % (ref 4.8–5.6)
HCT VFR BLD AUTO: 46.6 % (ref 37.5–51)
HDLC SERPL-MCNC: 34 MG/DL
HGB BLD-MCNC: 17.2 G/DL (ref 13–17.7)
IMM GRANULOCYTES # BLD AUTO: 0 X10E3/UL (ref 0–0.1)
IMM GRANULOCYTES NFR BLD AUTO: 1 %
INTERPRETATION, 910389: NORMAL
LDLC SERPL CALC-MCNC: 122 MG/DL (ref 0–99)
LYMPHOCYTES # BLD AUTO: 1.5 X10E3/UL (ref 0.7–3.1)
LYMPHOCYTES NFR BLD AUTO: 30 %
Lab: NORMAL
MCH RBC QN AUTO: 33 PG (ref 26.6–33)
MCHC RBC AUTO-ENTMCNC: 36.9 G/DL (ref 31.5–35.7)
MCV RBC AUTO: 89 FL (ref 79–97)
MONOCYTES # BLD AUTO: 0.4 X10E3/UL (ref 0.1–0.9)
MONOCYTES NFR BLD AUTO: 7 %
MORPHOLOGY BLD-IMP: ABNORMAL
NEUTROPHILS # BLD AUTO: 3 X10E3/UL (ref 1.4–7)
NEUTROPHILS NFR BLD AUTO: 58 %
PLATELET # BLD AUTO: 203 X10E3/UL (ref 150–379)
POTASSIUM SERPL-SCNC: 4.2 MMOL/L (ref 3.5–5.2)
PROT SERPL-MCNC: 7.6 G/DL (ref 6–8.5)
PSA SERPL-MCNC: 0.7 NG/ML (ref 0–4)
RBC # BLD AUTO: 5.22 X10E6/UL (ref 4.14–5.8)
SODIUM SERPL-SCNC: 141 MMOL/L (ref 134–144)
TESTOST FREE SERPL-MCNC: 4.2 PG/ML (ref 7.2–24)
TESTOST SERPL-MCNC: 128 NG/DL (ref 264–916)
TRIGL SERPL-MCNC: 210 MG/DL (ref 0–149)
TSH SERPL DL<=0.005 MIU/L-ACNC: 3.38 UIU/ML (ref 0.45–4.5)
VLDLC SERPL CALC-MCNC: 42 MG/DL (ref 5–40)
WBC # BLD AUTO: 5.1 X10E3/UL (ref 3.4–10.8)

## 2019-04-05 ENCOUNTER — OFFICE VISIT (OUTPATIENT)
Dept: FAMILY MEDICINE CLINIC | Age: 54
End: 2019-04-05

## 2019-04-05 DIAGNOSIS — Z51.6 ALLERGY DESENSITIZATION THERAPY: Primary | ICD-10-CM

## 2019-04-05 NOTE — PATIENT INSTRUCTIONS
A Healthy Lifestyle: Care Instructions  Your Care Instructions    A healthy lifestyle can help you feel good, stay at a healthy weight, and have plenty of energy for both work and play. A healthy lifestyle is something you can share with your whole family. A healthy lifestyle also can lower your risk for serious health problems, such as high blood pressure, heart disease, and diabetes. You can follow a few steps listed below to improve your health and the health of your family. Follow-up care is a key part of your treatment and safety. Be sure to make and go to all appointments, and call your doctor if you are having problems. It's also a good idea to know your test results and keep a list of the medicines you take. How can you care for yourself at home? · Do not eat too much sugar, fat, or fast foods. You can still have dessert and treats now and then. The goal is moderation. · Start small to improve your eating habits. Pay attention to portion sizes, drink less juice and soda pop, and eat more fruits and vegetables. ? Eat a healthy amount of food. A 3-ounce serving of meat, for example, is about the size of a deck of cards. Fill the rest of your plate with vegetables and whole grains. ? Limit the amount of soda and sports drinks you have every day. Drink more water when you are thirsty. ? Eat at least 5 servings of fruits and vegetables every day. It may seem like a lot, but it is not hard to reach this goal. A serving or helping is 1 piece of fruit, 1 cup of vegetables, or 2 cups of leafy, raw vegetables. Have an apple or some carrot sticks as an afternoon snack instead of a candy bar. Try to have fruits and/or vegetables at every meal.  · Make exercise part of your daily routine. You may want to start with simple activities, such as walking, bicycling, or slow swimming. Try to be active 30 to 60 minutes every day. You do not need to do all 30 to 60 minutes all at once.  For example, you can exercise 3 times a day for 10 or 20 minutes. Moderate exercise is safe for most people, but it is always a good idea to talk to your doctor before starting an exercise program.  · Keep moving. Yusef Leap the lawn, work in the garden, or [employer]. Take the stairs instead of the elevator at work. · If you smoke, quit. People who smoke have an increased risk for heart attack, stroke, cancer, and other lung illnesses. Quitting is hard, but there are ways to boost your chance of quitting tobacco for good. ? Use nicotine gum, patches, or lozenges. ? Ask your doctor about stop-smoking programs and medicines. ? Keep trying. In addition to reducing your risk of diseases in the future, you will notice some benefits soon after you stop using tobacco. If you have shortness of breath or asthma symptoms, they will likely get better within a few weeks after you quit. · Limit how much alcohol you drink. Moderate amounts of alcohol (up to 2 drinks a day for men, 1 drink a day for women) are okay. But drinking too much can lead to liver problems, high blood pressure, and other health problems. Family health  If you have a family, there are many things you can do together to improve your health. · Eat meals together as a family as often as possible. · Eat healthy foods. This includes fruits, vegetables, lean meats and dairy, and whole grains. · Include your family in your fitness plan. Most people think of activities such as jogging or tennis as the way to fitness, but there are many ways you and your family can be more active. Anything that makes you breathe hard and gets your heart pumping is exercise. Here are some tips:  ? Walk to do errands or to take your child to school or the bus.  ? Go for a family bike ride after dinner instead of watching TV. Where can you learn more? Go to http://torri-conor.info/. Enter Q521 in the search box to learn more about \"A Healthy Lifestyle: Care Instructions. \"  Current as of: September 11, 2018  Content Version: 11.9  © 0227-4332 [employer]. Care instructions adapted under license by [employer] (which disclaims liability or warranty for this information). If you have questions about a medical condition or this instruction, always ask your healthcare professional. [address] any warranty or liability for your use of this information.

## 2019-04-05 NOTE — PROGRESS NOTES
Chief Complaint   Patient presents with    Immunization/Injection     testosterone inj     Patient presents in office today for nurse visit only for testosterone injection. 2 ML given in the left gluteus. Tolerated well.

## 2019-04-20 RX ORDER — FENOFIBRATE 145 MG/1
TABLET, COATED ORAL
Qty: 30 TAB | Refills: 3 | Status: SHIPPED | OUTPATIENT
Start: 2019-04-20 | End: 2019-07-28 | Stop reason: SDUPTHER

## 2019-04-22 RX ORDER — CANAGLIFLOZIN 300 MG/1
TABLET, FILM COATED ORAL
Qty: 30 TAB | Refills: 5 | Status: SHIPPED | OUTPATIENT
Start: 2019-04-22 | End: 2019-10-02 | Stop reason: SDUPTHER

## 2019-05-03 ENCOUNTER — OFFICE VISIT (OUTPATIENT)
Dept: FAMILY MEDICINE CLINIC | Age: 54
End: 2019-05-03

## 2019-05-03 DIAGNOSIS — E29.1 HYPOGONADISM MALE: Primary | ICD-10-CM

## 2019-05-03 RX ORDER — TESTOSTERONE CYPIONATE 200 MG/ML
400 INJECTION INTRAMUSCULAR ONCE
Qty: 2 ML | Refills: 0
Start: 2019-05-03 | End: 2019-05-03

## 2019-05-03 NOTE — PATIENT INSTRUCTIONS
A Healthy Lifestyle: Care Instructions  Your Care Instructions    A healthy lifestyle can help you feel good, stay at a healthy weight, and have plenty of energy for both work and play. A healthy lifestyle is something you can share with your whole family. A healthy lifestyle also can lower your risk for serious health problems, such as high blood pressure, heart disease, and diabetes. You can follow a few steps listed below to improve your health and the health of your family. Follow-up care is a key part of your treatment and safety. Be sure to make and go to all appointments, and call your doctor if you are having problems. It's also a good idea to know your test results and keep a list of the medicines you take. How can you care for yourself at home? · Do not eat too much sugar, fat, or fast foods. You can still have dessert and treats now and then. The goal is moderation. · Start small to improve your eating habits. Pay attention to portion sizes, drink less juice and soda pop, and eat more fruits and vegetables. ? Eat a healthy amount of food. A 3-ounce serving of meat, for example, is about the size of a deck of cards. Fill the rest of your plate with vegetables and whole grains. ? Limit the amount of soda and sports drinks you have every day. Drink more water when you are thirsty. ? Eat at least 5 servings of fruits and vegetables every day. It may seem like a lot, but it is not hard to reach this goal. A serving or helping is 1 piece of fruit, 1 cup of vegetables, or 2 cups of leafy, raw vegetables. Have an apple or some carrot sticks as an afternoon snack instead of a candy bar. Try to have fruits and/or vegetables at every meal.  · Make exercise part of your daily routine. You may want to start with simple activities, such as walking, bicycling, or slow swimming. Try to be active 30 to 60 minutes every day. You do not need to do all 30 to 60 minutes all at once.  For example, you can exercise 3 times a day for 10 or 20 minutes. Moderate exercise is safe for most people, but it is always a good idea to talk to your doctor before starting an exercise program.  · Keep moving. Tammy Pages the lawn, work in the garden, or [employer]. Take the stairs instead of the elevator at work. · If you smoke, quit. People who smoke have an increased risk for heart attack, stroke, cancer, and other lung illnesses. Quitting is hard, but there are ways to boost your chance of quitting tobacco for good. ? Use nicotine gum, patches, or lozenges. ? Ask your doctor about stop-smoking programs and medicines. ? Keep trying. In addition to reducing your risk of diseases in the future, you will notice some benefits soon after you stop using tobacco. If you have shortness of breath or asthma symptoms, they will likely get better within a few weeks after you quit. · Limit how much alcohol you drink. Moderate amounts of alcohol (up to 2 drinks a day for men, 1 drink a day for women) are okay. But drinking too much can lead to liver problems, high blood pressure, and other health problems. Family health  If you have a family, there are many things you can do together to improve your health. · Eat meals together as a family as often as possible. · Eat healthy foods. This includes fruits, vegetables, lean meats and dairy, and whole grains. · Include your family in your fitness plan. Most people think of activities such as jogging or tennis as the way to fitness, but there are many ways you and your family can be more active. Anything that makes you breathe hard and gets your heart pumping is exercise. Here are some tips:  ? Walk to do errands or to take your child to school or the bus.  ? Go for a family bike ride after dinner instead of watching TV. Where can you learn more? Go to http://torri-conor.info/. Enter M150 in the search box to learn more about \"A Healthy Lifestyle: Care Instructions. \"  Current as of: September 11, 2018  Content Version: 11.9  © 6751-3704 [employer]. Care instructions adapted under license by [employer] (which disclaims liability or warranty for this information). If you have questions about a medical condition or this instruction, always ask your healthcare professional. [address] any warranty or liability for your use of this information.

## 2019-05-03 NOTE — PROGRESS NOTES
Chief Complaint   Patient presents with    Immunization/Injection     testosterone     Patient presents in office today for nurse visit only for T injection. 2 ML given in the right gluteus. Tolerated well.

## 2019-05-11 RX ORDER — METFORMIN HYDROCHLORIDE 500 MG/1
TABLET, EXTENDED RELEASE ORAL
Qty: 60 TAB | Refills: 3 | Status: SHIPPED | OUTPATIENT
Start: 2019-05-11 | End: 2019-08-19 | Stop reason: SDUPTHER

## 2019-06-07 ENCOUNTER — OFFICE VISIT (OUTPATIENT)
Dept: FAMILY MEDICINE CLINIC | Age: 54
End: 2019-06-07

## 2019-06-07 DIAGNOSIS — E29.1 HYPOGONADISM MALE: Primary | ICD-10-CM

## 2019-06-07 RX ORDER — TESTOSTERONE CYPIONATE 200 MG/ML
200 INJECTION INTRAMUSCULAR ONCE
Qty: 2 ML | Refills: 0
Start: 2019-06-07 | End: 2019-06-07

## 2019-06-07 NOTE — PROGRESS NOTES
Chief Complaint   Patient presents with    Immunization/Injection     testosterone      There were no vitals taken for this visit. After obtaining [address] consent, and per orders of saundra, injection of testoterone given by Racquel Killian in (L) delt. Patient instructed to remain in clinic for 20 minutes afterwards, and to report any adverse reaction to me immediately. Patient did not display any adverse side effects. Patient was advsied to return in 1 month.

## 2019-07-05 ENCOUNTER — OFFICE VISIT (OUTPATIENT)
Dept: FAMILY MEDICINE CLINIC | Age: 54
End: 2019-07-05

## 2019-07-05 DIAGNOSIS — E29.1 HYPOGONADISM MALE: Primary | ICD-10-CM

## 2019-07-05 RX ORDER — TESTOSTERONE CYPIONATE 200 MG/ML
400 INJECTION INTRAMUSCULAR ONCE
Qty: 2 ML | Refills: 0
Start: 2019-07-05 | End: 2019-07-05

## 2019-07-05 NOTE — PROGRESS NOTES
Chief Complaint   Patient presents with    Injection      There were no vitals taken for this visit. After obtaining [address] consent, and per orders of saundra, injection of 2ml testosterone given by Tiny Buddle in (R) glut. Patient instructed to remain in clinic for 20 minutes afterwards, and to report any adverse reaction to me immediately. Patient did not display any adverse side effects.    Patient was advsied to return in 1 month

## 2019-07-09 ENCOUNTER — DOCUMENTATION ONLY (OUTPATIENT)
Dept: FAMILY MEDICINE CLINIC | Age: 54
End: 2019-07-09

## 2019-07-09 NOTE — PROGRESS NOTES
Invokana submitted to [facility] via Cover My Meds. Awaiting reponse.    Key: [id number]

## 2019-07-12 ENCOUNTER — TELEPHONE (OUTPATIENT)
Dept: FAMILY MEDICINE CLINIC | Age: 54
End: 2019-07-12

## 2019-07-22 ENCOUNTER — DOCUMENTATION ONLY (OUTPATIENT)
Dept: FAMILY MEDICINE CLINIC | Age: 54
End: 2019-07-22

## 2019-07-22 NOTE — PROGRESS NOTES
Per pt insurance no PA required for Invokana 300 mg, copy faxed to pharmacy & Copy placed in scan folder to be scanned to chart.

## 2019-07-28 RX ORDER — FENOFIBRATE 145 MG/1
TABLET, COATED ORAL
Qty: 90 TAB | Refills: 1 | Status: SHIPPED | OUTPATIENT
Start: 2019-07-28 | End: 2020-01-21

## 2019-08-02 ENCOUNTER — OFFICE VISIT (OUTPATIENT)
Dept: FAMILY MEDICINE CLINIC | Age: 54
End: 2019-08-02

## 2019-08-02 DIAGNOSIS — E29.1 HYPOGONADISM MALE: Primary | ICD-10-CM

## 2019-08-02 RX ORDER — TESTOSTERONE CYPIONATE 200 MG/ML
200 INJECTION INTRAMUSCULAR ONCE
Qty: 2 ML | Refills: 0
Start: 2019-08-02 | End: 2019-08-02

## 2019-08-02 NOTE — PROGRESS NOTES
Chief Complaint   Patient presents with    Injection      There were no vitals taken for this visit. After obtaining [address] consent, and per orders of saundra, injection of 2ml testosterone given by Lakeshia Sousa in (L) glut. Patient instructed to remain in clinic for 20 minutes afterwards, and to report any adverse reaction to me immediately. Patient did not display any adverse side effects. Patient was advsied to return in 1 month(s).

## 2019-08-19 RX ORDER — METFORMIN HYDROCHLORIDE 500 MG/1
TABLET, EXTENDED RELEASE ORAL
Qty: 180 TAB | Refills: 1 | Status: SHIPPED | OUTPATIENT
Start: 2019-08-19 | End: 2020-02-08

## 2019-09-04 ENCOUNTER — TELEPHONE (OUTPATIENT)
Dept: FAMILY MEDICINE CLINIC | Age: 54
End: 2019-09-04

## 2019-09-04 ENCOUNTER — OFFICE VISIT (OUTPATIENT)
Dept: FAMILY MEDICINE CLINIC | Age: 54
End: 2019-09-04

## 2019-09-04 DIAGNOSIS — E29.1 HYPOGONADISM MALE: Primary | ICD-10-CM

## 2019-09-04 RX ORDER — TESTOSTERONE CYPIONATE 200 MG/ML
400 INJECTION INTRAMUSCULAR ONCE
Qty: 2 ML | Refills: 0
Start: 2019-09-04 | End: 2019-09-04

## 2019-09-04 NOTE — PROGRESS NOTES
Chief Complaint   Patient presents with    Injection      There were no vitals taken for this visit. After obtaining [address] consent, and per orders of saundra, injection of testosterone 2ml given by Robyn Herrera in (R) glut. Patient instructed to remain in clinic for 20 minutes afterwards, and to report any adverse reaction to me immediately. Patient did not display any adverse side effects. Patient was advsied to return in 1 month(s).

## 2019-09-04 NOTE — TELEPHONE ENCOUNTER
Pt scheduled for test. Injec. Still non in the office, phoned pt this morning to inform him that we could call into the pharmacy. Pt states he is not sure that his insurance will cover it which is why he comes into the office to have it done.

## 2019-09-10 ENCOUNTER — DOCUMENTATION ONLY (OUTPATIENT)
Dept: FAMILY MEDICINE CLINIC | Age: 54
End: 2019-09-10

## 2019-09-25 NOTE — PROGRESS NOTES
Per [employer] PA for [facility] has been dismissed.  PA is not required at this time and is eligible for the [facility]. Pt should call [phone]  Ref# [id number]

## 2019-10-02 ENCOUNTER — OFFICE VISIT (OUTPATIENT)
Dept: FAMILY MEDICINE CLINIC | Age: 54
End: 2019-10-02

## 2019-10-02 VITALS
SYSTOLIC BLOOD PRESSURE: 125 MMHG | BODY MASS INDEX: 32.73 KG/M2 | DIASTOLIC BLOOD PRESSURE: 79 MMHG | RESPIRATION RATE: 16 BRPM | HEART RATE: 68 BPM | TEMPERATURE: 98.5 F | OXYGEN SATURATION: 96 % | WEIGHT: 221 LBS | HEIGHT: 69 IN

## 2019-10-02 DIAGNOSIS — E11.9 TYPE 2 DIABETES MELLITUS WITHOUT COMPLICATION, WITHOUT LONG-TERM CURRENT USE OF INSULIN (HCC): ICD-10-CM

## 2019-10-02 DIAGNOSIS — E29.1 HYPOGONADISM MALE: Primary | ICD-10-CM

## 2019-10-02 DIAGNOSIS — Z23 ENCOUNTER FOR IMMUNIZATION: ICD-10-CM

## 2019-10-02 DIAGNOSIS — E78.1 HYPERTRIGLYCERIDEMIA: ICD-10-CM

## 2019-10-02 RX ORDER — TESTOSTERONE CYPIONATE 200 MG/ML
400 INJECTION INTRAMUSCULAR ONCE
Qty: 2 ML | Refills: 0
Start: 2019-10-02 | End: 2019-10-02

## 2019-10-02 NOTE — PROGRESS NOTES
Chief Complaint   Patient presents with    Injection    Labs     Pt in office today for labs and injection  1. Have you been to the ER, urgent care clinic since your last visit? Hospitalized since your last visit? No    2. Have you seen or consulted any other health care providers outside of the [address] [place] [address] since your last visit? Include any pap smears or colon screening. No      After obtaining Lender Apgar Bacon's consent, and per orders of dimitris, injection of testosterone 2ml given by Michelle Peterson in (L) glut. Patient instructed to remain in clinic for 20 minutes afterwards, and to report any adverse reaction to me immediately. Patient did not display any adverse side effects. Patient was advsied to return in 1 month(s). After obtaining [address] consent, and per orders of saundra, injection of flu 0.5ml given by Michelle Peterson in (L) delt. Patient instructed to remain in clinic for 20 minutes afterwards, and to report any adverse reaction to me immediately. Patient did not display any adverse side effects.        Pt has no other concerns

## 2019-10-05 LAB
ALBUMIN SERPL-MCNC: 4.7 G/DL (ref 3.5–5.5)
ALBUMIN/GLOB SERPL: 1.6 {RATIO} (ref 1.2–2.2)
ALP SERPL-CCNC: 83 IU/L (ref 39–117)
ALT SERPL-CCNC: 26 IU/L (ref 0–44)
AST SERPL-CCNC: 25 IU/L (ref 0–40)
BASOPHILS # BLD AUTO: 0 X10E3/UL (ref 0–0.2)
BASOPHILS NFR BLD AUTO: 1 %
BILIRUB SERPL-MCNC: 0.5 MG/DL (ref 0–1.2)
BUN SERPL-MCNC: 14 MG/DL (ref 6–24)
BUN/CREAT SERPL: 17 (ref 9–20)
CALCIUM SERPL-MCNC: 9.1 MG/DL (ref 8.7–10.2)
CHLORIDE SERPL-SCNC: 102 MMOL/L (ref 96–106)
CHOLEST SERPL-MCNC: 214 MG/DL (ref 100–199)
CO2 SERPL-SCNC: 23 MMOL/L (ref 20–29)
CREAT SERPL-MCNC: 0.83 MG/DL (ref 0.76–1.27)
EOSINOPHIL # BLD AUTO: 0.1 X10E3/UL (ref 0–0.4)
EOSINOPHIL NFR BLD AUTO: 3 %
ERYTHROCYTE [DISTWIDTH] IN BLOOD BY AUTOMATED COUNT: 13 % (ref 12.3–15.4)
EST. AVERAGE GLUCOSE BLD GHB EST-MCNC: 166 MG/DL
GLOBULIN SER CALC-MCNC: 2.9 G/DL (ref 1.5–4.5)
GLUCOSE SERPL-MCNC: 121 MG/DL (ref 65–99)
HBA1C MFR BLD: 7.4 % (ref 4.8–5.6)
HCT VFR BLD AUTO: 50.9 % (ref 37.5–51)
HDLC SERPL-MCNC: 39 MG/DL
HGB BLD-MCNC: 16.8 G/DL (ref 13–17.7)
IMM GRANULOCYTES # BLD AUTO: 0 X10E3/UL (ref 0–0.1)
IMM GRANULOCYTES NFR BLD AUTO: 0 %
INTERPRETATION, 910389: NORMAL
LDLC SERPL CALC-MCNC: 144 MG/DL (ref 0–99)
LYMPHOCYTES # BLD AUTO: 1.2 X10E3/UL (ref 0.7–3.1)
LYMPHOCYTES NFR BLD AUTO: 28 %
Lab: NORMAL
MCH RBC QN AUTO: 29.7 PG (ref 26.6–33)
MCHC RBC AUTO-ENTMCNC: 33 G/DL (ref 31.5–35.7)
MCV RBC AUTO: 90 FL (ref 79–97)
MONOCYTES # BLD AUTO: 0.4 X10E3/UL (ref 0.1–0.9)
MONOCYTES NFR BLD AUTO: 9 %
NEUTROPHILS # BLD AUTO: 2.5 X10E3/UL (ref 1.4–7)
NEUTROPHILS NFR BLD AUTO: 59 %
PLATELET # BLD AUTO: 220 X10E3/UL (ref 150–450)
POTASSIUM SERPL-SCNC: 4.2 MMOL/L (ref 3.5–5.2)
PROT SERPL-MCNC: 7.6 G/DL (ref 6–8.5)
PSA SERPL-MCNC: 0.7 NG/ML (ref 0–4)
RBC # BLD AUTO: 5.65 X10E6/UL (ref 4.14–5.8)
SODIUM SERPL-SCNC: 139 MMOL/L (ref 134–144)
SPECIMEN STATUS REPORT, ROLRST: NORMAL
TESTOST FREE SERPL-MCNC: 3 PG/ML (ref 7.2–24)
TESTOST SERPL-MCNC: 119 NG/DL (ref 264–916)
TRIGL SERPL-MCNC: 153 MG/DL (ref 0–149)
VLDLC SERPL CALC-MCNC: 31 MG/DL (ref 5–40)
WBC # BLD AUTO: 4.2 X10E3/UL (ref 3.4–10.8)

## 2019-10-07 NOTE — PROGRESS NOTES
Hey there, your labs for PSA and blood count look great. Your sugar is slightly better but still too high. You have got to get this down, really watch the diet for carbs/sweets etc. Your cholesterol is also quite high.  Did you ever take an actual statin for cholesterol? [facility]

## 2019-11-01 ENCOUNTER — OFFICE VISIT (OUTPATIENT)
Dept: FAMILY MEDICINE CLINIC | Age: 54
End: 2019-11-01

## 2019-11-01 DIAGNOSIS — E29.1 HYPOGONADISM MALE: Primary | ICD-10-CM

## 2019-11-01 RX ORDER — TESTOSTERONE CYPIONATE 200 MG/ML
400 INJECTION INTRAMUSCULAR ONCE
Qty: 2 ML | Refills: 0
Start: 2019-11-01 | End: 2019-11-01

## 2019-11-01 NOTE — PROGRESS NOTES
Chief Complaint   Patient presents with    Injection     Pt in office today for testosterone injection        After obtaining [address] consent, and per orders of saundra, injection of 2ml testosterone given by Carlos A Negro in (R) glut. Patient instructed to remain in clinic for 20 minutes afterwards, and to report any adverse reaction to me immediately. Patient did not display any adverse side effects. Patient was advsied to return in 1 month(s).      Pt has no other concerns

## 2019-12-06 ENCOUNTER — OFFICE VISIT (OUTPATIENT)
Dept: FAMILY MEDICINE CLINIC | Age: 54
End: 2019-12-06

## 2019-12-06 DIAGNOSIS — E29.1 HYPOGONADISM MALE: Primary | ICD-10-CM

## 2019-12-06 RX ORDER — TESTOSTERONE CYPIONATE 200 MG/ML
400 INJECTION INTRAMUSCULAR ONCE
Qty: 2 ML | Refills: 0
Start: 2019-12-06 | End: 2019-12-06

## 2019-12-06 NOTE — PROGRESS NOTES
Chief Complaint   Patient presents with    Injection      There were no vitals taken for this visit. After obtaining [address] consent, and per orders of saundra, injection of testosterone 2ml given by Lulú Auguste in (L) glut. Patient instructed to remain in clinic for 20 minutes afterwards, and to report any adverse reaction to me immediately. Patient did not display any adverse side effects. Patient was advsied to return in 1 month(s).

## 2019-12-23 ENCOUNTER — TELEPHONE (OUTPATIENT)
Dept: FAMILY MEDICINE CLINIC | Age: 54
End: 2019-12-23

## 2019-12-23 NOTE — TELEPHONE ENCOUNTER
INvokana submitted to [facility] via Cover My Meds. Awaiting reponse.    Key: [id number]

## 2020-01-06 ENCOUNTER — OFFICE VISIT (OUTPATIENT)
Dept: FAMILY MEDICINE CLINIC | Age: 55
End: 2020-01-06

## 2020-01-06 DIAGNOSIS — E29.1 HYPOGONADISM MALE: Primary | ICD-10-CM

## 2020-01-06 RX ORDER — TESTOSTERONE CYPIONATE 200 MG/ML
400 INJECTION INTRAMUSCULAR ONCE
Qty: 2 ML | Refills: 0
Start: 2020-01-06 | End: 2020-01-06

## 2020-01-06 NOTE — PROGRESS NOTES
Chief Complaint   Patient presents with    Injection     testosterone     Pt in offie today for testosterone      After obtaining [address] consent, and per orders of saundra, injection of testosterone 2ml given by Sari Colvin in (R) glut. Patient instructed to remain in clinic for 20 minutes afterwards, and to report any adverse reaction to me immediately. Patient did not display any adverse side effects. Patient was advsied to return in ,1 month(s).

## 2020-01-21 RX ORDER — FENOFIBRATE 145 MG/1
TABLET, COATED ORAL
Qty: 90 TAB | Refills: 1 | Status: SHIPPED | OUTPATIENT
Start: 2020-01-21 | End: 2020-07-26 | Stop reason: SDUPTHER

## 2020-02-03 NOTE — TELEPHONE ENCOUNTER
Received notification from [address] [place] [address]  stating that that PA for Invokana has been dismissed. Reason stated: Prior Authorization is not required for this drug at this time.    Ref#: [id number]

## 2020-02-04 ENCOUNTER — TELEPHONE (OUTPATIENT)
Dept: FAMILY MEDICINE CLINIC | Age: 55
End: 2020-02-04

## 2020-02-04 DIAGNOSIS — E29.1 HYPOGONADISM MALE: Primary | ICD-10-CM

## 2020-02-04 RX ORDER — TESTOSTERONE CYPIONATE 200 MG/ML
400 INJECTION INTRAMUSCULAR ONCE
Qty: 2 ML | Refills: 5 | Status: SHIPPED | OUTPATIENT
Start: 2020-02-04 | End: 2020-02-04

## 2020-02-04 NOTE — TELEPHONE ENCOUNTER
Spoke with pt id x3 informed pt that we will no longer be getting the testosterone in the office but that provider can send Rx to his pharmacy.  Pt verbalized understanding

## 2020-02-06 ENCOUNTER — OFFICE VISIT (OUTPATIENT)
Dept: FAMILY MEDICINE CLINIC | Age: 55
End: 2020-02-06

## 2020-02-06 DIAGNOSIS — E29.1 HYPOGONADISM MALE: Primary | ICD-10-CM

## 2020-02-06 RX ORDER — TESTOSTERONE CYPIONATE 200 MG/ML
400 INJECTION INTRAMUSCULAR ONCE
Qty: 2 ML | Refills: 0 | Status: SHIPPED | COMMUNITY
Start: 2020-02-06 | End: 2020-02-06

## 2020-02-08 RX ORDER — TESTOSTERONE CYPIONATE 200 MG/ML
200 INJECTION INTRAMUSCULAR EVERY 2 WEEKS
Qty: 2 ML | Refills: 5 | Status: SHIPPED | OUTPATIENT
Start: 2020-02-08 | End: 2020-08-27

## 2020-02-08 RX ORDER — METFORMIN HYDROCHLORIDE 500 MG/1
TABLET, EXTENDED RELEASE ORAL
Qty: 180 TAB | Refills: 1 | Status: SHIPPED | OUTPATIENT
Start: 2020-02-08 | End: 2020-08-07

## 2020-04-08 NOTE — TELEPHONE ENCOUNTER
**Please send refill to [address] [place] [address] [address]  [place], [address]  Phone [phone]  Fax# [phone]

## 2020-07-26 RX ORDER — FENOFIBRATE 145 MG/1
TABLET, COATED ORAL
Qty: 90 TAB | Refills: 1 | Status: SHIPPED | OUTPATIENT
Start: 2020-07-26 | End: 2021-01-27

## 2020-08-07 RX ORDER — METFORMIN HYDROCHLORIDE 500 MG/1
TABLET, EXTENDED RELEASE ORAL
Qty: 180 TAB | Refills: 1 | Status: SHIPPED | OUTPATIENT
Start: 2020-08-07 | End: 2021-02-07

## 2020-08-29 RX ORDER — NEEDLES, SAFETY 22GX1 1/2"
NEEDLE, DISPOSABLE MISCELLANEOUS
Qty: 2 SYRINGE | Refills: 5 | Status: SHIPPED | OUTPATIENT
Start: 2020-08-29 | End: 2021-01-31

## 2020-09-17 ENCOUNTER — PATIENT MESSAGE (OUTPATIENT)
Dept: FAMILY MEDICINE CLINIC | Age: 55
End: 2020-09-17

## 2020-10-28 DIAGNOSIS — E29.1 HYPOGONADISM MALE: ICD-10-CM

## 2020-10-28 RX ORDER — TESTOSTERONE CYPIONATE 200 MG/ML
INJECTION INTRAMUSCULAR
Qty: 2 ML | Refills: 1 | Status: SHIPPED | OUTPATIENT
Start: 2020-10-28 | End: 2020-12-29

## 2020-10-28 NOTE — TELEPHONE ENCOUNTER
Spoke with pt id x3 informed pt that his med has been sent to the pharmacy and that he needs to come in for labs.  Pt states he will call back to schedule labs

## 2020-11-23 ENCOUNTER — OFFICE VISIT (OUTPATIENT)
Dept: FAMILY MEDICINE CLINIC | Age: 55
End: 2020-11-23
Payer: COMMERCIAL

## 2020-11-23 VITALS
HEART RATE: 75 BPM | RESPIRATION RATE: 14 BRPM | BODY MASS INDEX: 32.73 KG/M2 | HEIGHT: 69 IN | SYSTOLIC BLOOD PRESSURE: 116 MMHG | TEMPERATURE: 98.2 F | WEIGHT: 221 LBS | DIASTOLIC BLOOD PRESSURE: 80 MMHG | OXYGEN SATURATION: 97 %

## 2020-11-23 DIAGNOSIS — E29.1 HYPOGONADISM MALE: ICD-10-CM

## 2020-11-23 DIAGNOSIS — Z23 NEEDS FLU SHOT: ICD-10-CM

## 2020-11-23 DIAGNOSIS — E11.9 TYPE 2 DIABETES MELLITUS WITHOUT COMPLICATION, WITHOUT LONG-TERM CURRENT USE OF INSULIN (HCC): ICD-10-CM

## 2020-11-23 DIAGNOSIS — E78.1 HYPERTRIGLYCERIDEMIA: ICD-10-CM

## 2020-11-23 DIAGNOSIS — Z00.00 WELL ADULT EXAM: Primary | ICD-10-CM

## 2020-11-23 PROCEDURE — 90686 IIV4 VACC NO PRSV 0.5 ML IM: CPT | Performed by: NURSE PRACTITIONER

## 2020-11-23 PROCEDURE — 99396 PREV VISIT EST AGE 40-64: CPT | Performed by: NURSE PRACTITIONER

## 2020-11-23 PROCEDURE — 90471 IMMUNIZATION ADMIN: CPT | Performed by: NURSE PRACTITIONER

## 2020-11-23 NOTE — PROGRESS NOTES
Chief Complaint   Patient presents with    Injection    Labs     Pt in office today for labs  -injection    1. Have you been to the ER, urgent care clinic since your last visit? Hospitalized since your last visit? No    2. Have you seen or consulted any other health care providers outside of the [address] [place] [address] since your last visit? Include any pap smears or colon screening.  No     Pt has no other concerns

## 2020-11-25 LAB
ALBUMIN SERPL-MCNC: 4.1 G/DL (ref 3.5–5)
ALBUMIN/GLOB SERPL: 1.1 {RATIO} (ref 1.1–2.2)
ALP SERPL-CCNC: 104 U/L (ref 45–117)
ALT SERPL-CCNC: 48 U/L (ref 12–78)
ANION GAP SERPL CALC-SCNC: 7 MMOL/L (ref 5–15)
AST SERPL-CCNC: 37 U/L (ref 15–37)
BASOPHILS # BLD: 0 K/UL (ref 0–0.1)
BASOPHILS NFR BLD: 1 % (ref 0–1)
BILIRUB SERPL-MCNC: 0.5 MG/DL (ref 0.2–1)
BUN SERPL-MCNC: 9 MG/DL (ref 6–20)
BUN/CREAT SERPL: 9 (ref 12–20)
CALCIUM SERPL-MCNC: 9.2 MG/DL (ref 8.5–10.1)
CHLORIDE SERPL-SCNC: 101 MMOL/L (ref 97–108)
CHOLEST SERPL-MCNC: 210 MG/DL
CO2 SERPL-SCNC: 27 MMOL/L (ref 21–32)
CREAT SERPL-MCNC: 0.98 MG/DL (ref 0.7–1.3)
DIFFERENTIAL METHOD BLD: ABNORMAL
EOSINOPHIL # BLD: 0.1 K/UL (ref 0–0.4)
EOSINOPHIL NFR BLD: 2 % (ref 0–7)
ERYTHROCYTE [DISTWIDTH] IN BLOOD BY AUTOMATED COUNT: 12.8 % (ref 11.5–14.5)
EST. AVERAGE GLUCOSE BLD GHB EST-MCNC: 174 MG/DL
GLOBULIN SER CALC-MCNC: 3.6 G/DL (ref 2–4)
GLUCOSE SERPL-MCNC: 113 MG/DL (ref 65–100)
HBA1C MFR BLD: 7.7 % (ref 4–5.6)
HCT VFR BLD AUTO: 52.7 % (ref 36.6–50.3)
HDLC SERPL-MCNC: 33 MG/DL
HDLC SERPL: 6.4 {RATIO} (ref 0–5)
HGB BLD-MCNC: 16.6 G/DL (ref 12.1–17)
IMM GRANULOCYTES # BLD AUTO: 0.1 K/UL (ref 0–0.04)
IMM GRANULOCYTES NFR BLD AUTO: 1 % (ref 0–0.5)
LDLC SERPL CALC-MCNC: 120 MG/DL (ref 0–100)
LIPID PROFILE,FLP: ABNORMAL
LYMPHOCYTES # BLD: 1.6 K/UL (ref 0.8–3.5)
LYMPHOCYTES NFR BLD: 31 % (ref 12–49)
MCH RBC QN AUTO: 29.9 PG (ref 26–34)
MCHC RBC AUTO-ENTMCNC: 31.5 G/DL (ref 30–36.5)
MCV RBC AUTO: 95 FL (ref 80–99)
MONOCYTES # BLD: 0.3 K/UL (ref 0–1)
MONOCYTES NFR BLD: 7 % (ref 5–13)
NEUTS SEG # BLD: 2.9 K/UL (ref 1.8–8)
NEUTS SEG NFR BLD: 58 % (ref 32–75)
NRBC # BLD: 0 K/UL (ref 0–0.01)
NRBC BLD-RTO: 0 PER 100 WBC
PLATELET # BLD AUTO: 237 K/UL (ref 150–400)
PMV BLD AUTO: 10.4 FL (ref 8.9–12.9)
POTASSIUM SERPL-SCNC: 3.7 MMOL/L (ref 3.5–5.1)
PROT SERPL-MCNC: 7.7 G/DL (ref 6.4–8.2)
PSA SERPL-MCNC: 0.5 NG/ML (ref 0.01–4)
RBC # BLD AUTO: 5.55 M/UL (ref 4.1–5.7)
SODIUM SERPL-SCNC: 135 MMOL/L (ref 136–145)
TESTOST FREE SERPL-MCNC: 20.9 PG/ML (ref 7.2–24)
TESTOST SERPL-MCNC: 615 NG/DL (ref 264–916)
TRIGL SERPL-MCNC: 285 MG/DL (ref ?–150)
TSH SERPL DL<=0.05 MIU/L-ACNC: 2.09 UIU/ML (ref 0.36–3.74)
VLDLC SERPL CALC-MCNC: 57 MG/DL
WBC # BLD AUTO: 5 K/UL (ref 4.1–11.1)

## 2020-11-25 NOTE — PROGRESS NOTES
Subjective:   Ling Olmos is a 63 y.o. male presenting for his annual checkup. ROS:  Feeling well. No dyspnea or chest pain on exertion. No abdominal pain, change in bowel habits, black or bloody stools. No urinary tract or prostatic symptoms. No neurological complaints. Specific concerns today: due for labs for testosterone. Patient Active Problem List    Diagnosis Date Noted    Allergy desensitization therapy 04/05/2019    Diabetes mellitus ([facility] [address] 04/03/2012    Hypertriglyceridemia 04/03/2012    Finger(s) amputation status 05/13/2010    MOOK (obstructive sleep apnea) 05/13/2010    Hypogonadism male 05/13/2010     Current Outpatient Medications   Medication Sig Dispense Refill    testosterone cypionate (DEPOTESTOTERONE CYPIONATE) 200 mg/mL injection INJECT 1 ML INTRAMUSCULARLY ONCE EVERY 2 WEEKS 2 mL 1    Syringe with Needle, Disp, (BD SafetyGlide Syringe) 3 mL 23 x 1\" syrg TO BE USED WITH TESTOSTERONE INJECTION EVERY 2 WEEKS 2 Syringe 5    metFORMIN ER (GLUCOPHAGE XR) 500 mg tablet TAKE 2 TABLETS BY MOUTH DAILY WITH DINNER 180 Tab 1    fenofibrate nanocrystallized (TRICOR) 145 mg tablet TAKE 1 TABLET BY MOUTH EVERY DAY 90 Tab 1    canagliflozin (Invokana) 300 mg tablet TAKE 1 TABLET BY MOUTH EVERY DAY 90 Tab 3    clobetasol (TEMOVATE) 0.05 % topical cream APPLY TOPICALLY TO AFFECTED AREA TWO TIMES A DAY 60 g 0    glucose blood VI test strips (ASCENSIA AUTODISC VI, ONE TOUCH ULTRA TEST VI) strip Bid monitoring dx: 250.00 1 Package 11    Lancets misc Bid monitoring dx: 250.00 1 Package 11    Blood-Glucose Meter (ONE TOUCH ULTRA SYSTEM KIT) monitoring kit by Does Not Apply route. Dx: 250.00   1 Kit 0     Family History   Problem Relation Age of Onset    No Known Problems Mother      Social History     Tobacco Use    Smoking status: Former Smoker     Years: 10.00    Smokeless tobacco: Never Used   Substance Use Topics    Alcohol use:  Yes     Alcohol/week: 0.0 standard drinks     Comment: occas             Objective:     Visit Vitals  /80 (BP 1 Location: Right arm, BP Patient Position: Sitting)   Pulse 75   Temp 98.2 °F (36.8 °C) (Oral)   Resp 14   Ht 5' 9\" (1.753 m)   Wt 221 lb (100.2 kg)   SpO2 97%   BMI 32.64 kg/m²     The patient appears well, alert, oriented x 3, in no distress. ENT normal.  Neck supple. No adenopathy or thyromegaly. DEVON. Lungs are clear, good air entry, no wheezes, rhonchi or rales. S1 and S2 normal, no murmurs, regular rate and rhythm. Abdomen is soft without tenderness, guarding, mass or organomegaly.  exam: deferred. Extremities show no edema, normal peripheral pulses. Neurological is normal without focal findings. Assessment/Plan:   healthy adult male  lose weight, increase physical activity, follow low fat diet, follow low salt diet, routine labs ordered. Encounter Diagnoses   Name Primary?  Well adult exam Yes    Hypogonadism male     Type 2 diabetes mellitus without complication, without long-term current use of insulin (HCC)     Hypertriglyceridemia     Needs flu shot      Orders Placed This Encounter    Influenza Virus Vaccine QUAD, PF Syr 6 Months + (Flulaval, Fluzone, Fluarix [id number])    LIPID PANEL    CBC WITH AUTOMATED DIFF    METABOLIC PANEL, COMPREHENSIVE    TSH 3RD GENERATION    HEMOGLOBIN A1C WITH EAG    PSA SCREENING (SCREENING)    TESTOSTERONE, FREE & TOTAL   . I have discussed the diagnosis with the patient and the intended plan as seen in the above orders. The patient has received an after-visit summary and questions were answered concerning future plans. Patient conveyed understanding of the plan at the time of the visit.     Marimar Tinajero, MSN, ANP  11/24/2020

## 2020-11-30 NOTE — PROGRESS NOTES
Hey there, your labs are great for blood count, thyroid, prostate, and testosterone. Your sugar has climbed higher and now your cholesterol is really important to get this as low as possible. You have got to watch the diet and I need to add a statin to get the total cholesterol down for heart attack and stroke due to diabetes. Let me know your thoughts.  Gwendolyn Macias

## 2020-12-29 DIAGNOSIS — E29.1 HYPOGONADISM MALE: ICD-10-CM

## 2020-12-29 RX ORDER — TESTOSTERONE CYPIONATE 200 MG/ML
INJECTION INTRAMUSCULAR
Qty: 2 ML | Refills: 1 | Status: SHIPPED | OUTPATIENT
Start: 2020-12-29 | End: 2021-03-08

## 2021-01-27 RX ORDER — FENOFIBRATE 145 MG/1
TABLET, COATED ORAL
Qty: 90 TAB | Refills: 1 | Status: SHIPPED | OUTPATIENT
Start: 2021-01-27 | End: 2021-07-25

## 2021-01-31 RX ORDER — NEEDLES, SAFETY 22GX1 1/2"
NEEDLE, DISPOSABLE MISCELLANEOUS
Qty: 2 SYRINGE | Refills: 5 | Status: SHIPPED | OUTPATIENT
Start: 2021-01-31 | End: 2021-09-22

## 2021-02-07 RX ORDER — METFORMIN HYDROCHLORIDE 500 MG/1
TABLET, EXTENDED RELEASE ORAL
Qty: 180 TAB | Refills: 1 | Status: SHIPPED | OUTPATIENT
Start: 2021-02-07 | End: 2021-08-04

## 2021-03-08 DIAGNOSIS — E29.1 HYPOGONADISM MALE: ICD-10-CM

## 2021-03-08 RX ORDER — TESTOSTERONE CYPIONATE 200 MG/ML
INJECTION INTRAMUSCULAR
Qty: 2 ML | Refills: 1 | Status: SHIPPED | OUTPATIENT
Start: 2021-03-08 | End: 2021-05-17

## 2021-04-13 RX ORDER — LANCETS
EACH MISCELLANEOUS
Qty: 100 LANCET | Refills: 5 | Status: SHIPPED | OUTPATIENT
Start: 2021-04-13

## 2021-05-15 DIAGNOSIS — E29.1 HYPOGONADISM MALE: ICD-10-CM

## 2021-05-17 RX ORDER — TESTOSTERONE CYPIONATE 200 MG/ML
INJECTION INTRAMUSCULAR
Qty: 2 ML | Refills: 1 | Status: SHIPPED | OUTPATIENT
Start: 2021-05-17 | End: 2021-07-17

## 2021-07-16 DIAGNOSIS — E29.1 HYPOGONADISM MALE: ICD-10-CM

## 2021-07-17 RX ORDER — TESTOSTERONE CYPIONATE 200 MG/ML
INJECTION INTRAMUSCULAR
Qty: 2 ML | Refills: 1 | Status: SHIPPED | OUTPATIENT
Start: 2021-07-17 | End: 2021-10-20 | Stop reason: SDUPTHER

## 2021-07-25 RX ORDER — FENOFIBRATE 145 MG/1
TABLET, COATED ORAL
Qty: 90 TABLET | Refills: 1 | Status: SHIPPED | OUTPATIENT
Start: 2021-07-25 | End: 2022-01-25

## 2021-08-04 RX ORDER — METFORMIN HYDROCHLORIDE 500 MG/1
TABLET, EXTENDED RELEASE ORAL
Qty: 180 TABLET | Refills: 1 | Status: SHIPPED | OUTPATIENT
Start: 2021-08-04 | End: 2021-10-21

## 2021-09-13 ENCOUNTER — TELEPHONE (OUTPATIENT)
Dept: FAMILY MEDICINE CLINIC | Age: 56
End: 2021-09-13

## 2021-09-22 RX ORDER — NEEDLES, SAFETY 22GX1 1/2"
NEEDLE, DISPOSABLE MISCELLANEOUS
Qty: 2 EACH | Refills: 5 | Status: SHIPPED | OUTPATIENT
Start: 2021-09-22 | End: 2022-06-02

## 2021-10-20 ENCOUNTER — OFFICE VISIT (OUTPATIENT)
Dept: FAMILY MEDICINE CLINIC | Age: 56
End: 2021-10-20
Payer: COMMERCIAL

## 2021-10-20 VITALS
OXYGEN SATURATION: 96 % | DIASTOLIC BLOOD PRESSURE: 84 MMHG | BODY MASS INDEX: 33.18 KG/M2 | RESPIRATION RATE: 16 BRPM | HEIGHT: 69 IN | TEMPERATURE: 98.3 F | SYSTOLIC BLOOD PRESSURE: 125 MMHG | HEART RATE: 86 BPM | WEIGHT: 224 LBS

## 2021-10-20 DIAGNOSIS — Z23 NEEDS FLU SHOT: ICD-10-CM

## 2021-10-20 DIAGNOSIS — E11.9 TYPE 2 DIABETES MELLITUS WITHOUT COMPLICATION, WITHOUT LONG-TERM CURRENT USE OF INSULIN (HCC): ICD-10-CM

## 2021-10-20 DIAGNOSIS — Z00.00 ROUTINE GENERAL MEDICAL EXAMINATION AT A HEALTH CARE FACILITY: Primary | ICD-10-CM

## 2021-10-20 DIAGNOSIS — E78.1 HYPERTRIGLYCERIDEMIA: ICD-10-CM

## 2021-10-20 DIAGNOSIS — E29.1 HYPOGONADISM MALE: ICD-10-CM

## 2021-10-20 LAB — LDLC SERPL DIRECT ASSAY-MCNC: 141 MG/DL (ref 0–100)

## 2021-10-20 PROCEDURE — 99396 PREV VISIT EST AGE 40-64: CPT | Performed by: FAMILY MEDICINE

## 2021-10-20 PROCEDURE — 90471 IMMUNIZATION ADMIN: CPT | Performed by: FAMILY MEDICINE

## 2021-10-20 PROCEDURE — 90686 IIV4 VACC NO PRSV 0.5 ML IM: CPT | Performed by: FAMILY MEDICINE

## 2021-10-20 RX ORDER — TESTOSTERONE CYPIONATE 200 MG/ML
INJECTION INTRAMUSCULAR
Qty: 2 ML | Refills: 5 | Status: SHIPPED | OUTPATIENT
Start: 2021-10-20 | End: 2022-01-25 | Stop reason: SDUPTHER

## 2021-10-20 NOTE — PATIENT INSTRUCTIONS
Vaccine Information Statement    Influenza (Flu) Vaccine (Inactivated or Recombinant): What You Need to Know    Many vaccine information statements are available in [place] and other languages. See www.immunize.org/vis. Hojas de información sobre vacunas están disponibles en español y en muchos otros idiomas. Visite www.immunize.org/vis. 1. Why get vaccinated? Influenza vaccine can prevent influenza (flu). Flu is a contagious disease that spreads around the [facility] every year, usually between October and May. Anyone can get the flu, but it is more dangerous for some people. Infants and young children, people 72 years and older, pregnant people, and people with certain health conditions or a weakened immune system are at greatest risk of flu complications. Pneumonia, bronchitis, sinus infections, and ear infections are examples of flu-related complications. If you have a medical condition, such as heart disease, cancer, or diabetes, flu can make it worse. Flu can cause fever and chills, sore throat, muscle aches, fatigue, cough, headache, and runny or stuffy nose. Some people may have vomiting and diarrhea, though this is more common in children than adults. In an average year, thousands of people in the [facility] die from flu, and many more are hospitalized. Flu vaccine prevents millions of illnesses and flu-related visits to the doctor each year. 2. Influenza vaccines     CDC recommends everyone 6 months and older get vaccinated every flu season. Children 6 months through 6years of age may need 2 doses during a single flu season. Everyone else needs only 1 dose each flu season. It takes about 2 weeks for protection to develop after vaccination. There are many flu viruses, and they are always changing. Each year a new flu vaccine is made to protect against the influenza viruses believed to be likely to cause disease in the upcoming flu season.  Even when the vaccine doesnt exactly match these viruses, it may still provide some protection. Influenza vaccine does not cause flu. Influenza vaccine may be given at the same time as other vaccines. 3. Talk with your health care provider    Tell your vaccination provider if the person getting the vaccine:   Has had an allergic reaction after a previous dose of influenza vaccine, or has any severe, life-threatening allergies    Has ever had Guillain-Barré Syndrome (also called GBS)    In some cases, your health care provider may decide to postpone influenza vaccination until a future visit. Influenza vaccine can be administered at any time during pregnancy. People who are or will be pregnant during influenza season should receive inactivated influenza vaccine. People with minor illnesses, such as a cold, may be vaccinated. People who are moderately or severely ill should usually wait until they recover before getting influenza vaccine. Your health care provider can give you more information. 4. Risks of a vaccine reaction     Soreness, redness, and swelling where the shot is given, fever, muscle aches, and headache can happen after influenza vaccination.  There may be a very small increased risk of Guillain-Barré Syndrome (GBS) after inactivated influenza vaccine (the flu shot). Mela Peaks children who get the flu shot along with pneumococcal vaccine (PCV13) and/or DTaP vaccine at the same time might be slightly more likely to have a seizure caused by fever. Tell your health care provider if a child who is getting flu vaccine has ever had a seizure. People sometimes faint after medical procedures, including vaccination. Tell your provider if you feel dizzy or have vision changes or ringing in the ears. As with any medicine, there is a very remote chance of a vaccine causing a severe allergic reaction, other serious injury, or death. 5. What if there is a serious problem?     An allergic reaction could occur after the vaccinated person leaves the clinic. If you see signs of a severe allergic reaction (hives, swelling of the face and throat, difficulty breathing, a fast heartbeat, dizziness, or weakness), call 9-1-1 and get the person to the nearest hospital.    For other signs that concern you, call your health care provider. Adverse reactions should be reported to the Vaccine Adverse Event Reporting System (VAERS). Your health care provider will usually file this report, or you can do it yourself. Visit the VAERS website at www.vaers. [facility].gov or call 6-809.176.5495. VAERS is only for reporting reactions, and VAERS staff members do not give medical advice. 6. The National Vaccine Injury Compensation Program    The [facility] Vaccine Injury Compensation Program (VICP) is a federal program that was created to compensate people who may have been injured by certain vaccines. Claims regarding alleged injury or death due to vaccination have a time limit for filing, which may be as short as two years. Visit the VICP website at www.[facility].gov/vaccinecompensation or call 9-963.303.8540 to learn about the program and about filing a claim. 7. How can I learn more?  Ask your health care provider.  Call your local or state health department.  Visit the website of the Food and Drug Administration (FDA) for vaccine package inserts and additional information at www.fda.gov/vaccines-blood-biologics/vaccines.  Contact the Centers for Disease Control and Prevention (CDC):  - Call 8-291.385.8710 (1-800-CDC-INFO) or  - Visit CDCs influenza website at www.cdc.gov/flu. Vaccine Information Statement   Inactivated Influenza Vaccine   8/6/2021  42 AJ Benavides [id number]   Department of Health and Human Services  Centers for Disease Control and Prevention    Office Use Only

## 2021-10-20 NOTE — PROGRESS NOTES
Chief Complaint   Patient presents with    Physical    Diabetes    Labs     NON  Fasting    Immunization/Injection     Flu     1. Have you been to the ER, urgent care clinic since your last visit? Hospitalized since your last visit? No    2. Have you seen or consulted any other health care providers outside of the [address] [place] [address] since your last visit? Include any pap smears or colon screening. No      Chief Complaint   Patient presents with    Physical    Diabetes    Labs     NON  Fasting    Immunization/Injection     Flu     he is a 61y.o. year old male who presents for evalution. Reviewed PmHx, RxHx, FmHx, SocHx, AllgHx and updated and dated in the chart. Patient Active Problem List    Diagnosis    Allergy desensitization therapy    Diabetes mellitus ([facility] [address]    Hypertriglyceridemia    Finger(s) amputation status     TIPS TWO FINGERS      MOOK (obstructive sleep apnea)     CPAP      Hypogonadism male       Review of Systems - negative except as listed above in the HPI    Objective:     Vitals:    10/20/21 1527   BP: 125/84   Pulse: 86   Resp: 16   Temp: 98.3 °F (36.8 °C)   TempSrc: Oral   SpO2: 96%   Weight: 224 lb (101.6 kg)   Height: 5' 9\" (1.753 m)     Physical Examination: General appearance - alert, well appearing, and in no distress  Chest - clear to auscultation, no wheezes, rales or rhonchi, symmetric air entry  Heart - normal rate, regular rhythm, normal S1, S2, no murmurs, rubs, clicks or gallops       Assessment/ Plan:   Diagnoses and all orders for this visit:    1. Routine general medical examination at a health care facility  -     LIPID PANEL; Future  -     METABOLIC PANEL, COMPREHENSIVE; Future  -     CBC WITH AUTOMATED DIFF; Future  -     TSH 3RD GENERATION; Future  -     HEMOGLOBIN A1C WITH EAG; Future  -     PSA W/ REFLX FREE PSA; Future  -needs scope but pt refuses      2.  Type 2 diabetes mellitus without complication, without long-term current use of insulin ([facility] [address]  -     MICROALBUMIN, UR, RAND W/ MICROALB/CREAT RATIO; Future  -     LIPID PANEL; Future  -     METABOLIC PANEL, COMPREHENSIVE; Future  -     CBC WITH AUTOMATED DIFF; Future  -     TSH 3RD GENERATION; Future  -     HEMOGLOBIN A1C WITH EAG; Future  Lab Results   Component Value Date/Time    Hemoglobin A1c 7.7 (H) 11/24/2020 02:39 PM     -not at goal, diet is poor and not following up  -may inc metformin if elevated    3. Hypertriglyceridemia  -     LIPID PANEL; Future  -     METABOLIC PANEL, COMPREHENSIVE; Future    4. Needs flu shot  -     INFLUENZA VIRUS VAC QUAD,SPLIT,PRESV FREE SYRINGE IM    5. Hypogonadism male  -     testosterone cypionate (DEPOTESTOTERONE CYPIONATE) 200 mg/mL injection; INJECT 1 ML INTRAMUSCULARLY ONCE EVERY 2 WEEKS       -Patient is in good health  -Discussed with patient cancer risk factors and screens needed  -Colonoscopy was recommended based on current guidelines for screening.  -Labs from previous visits were discussed with patient yes  -Discussed with patient diet and exercise  -Immunizations appropriate for age were discussed with pt and updated  -    I have discussed the diagnosis with the patient and the intended plan as seen in the above orders. The patient understands and agrees with the plan. The patient has received an after-visit summary and questions were answered concerning future plans. Medication Side Effects and Warnings were discussed with patient  Patient Labs were reviewed and or requested  Patient Past Records were reviewed and or requested     Patient Instructions     Vaccine Information Statement    Influenza (Flu) Vaccine (Inactivated or Recombinant): What You Need to Know    Many vaccine information statements are available in [place]ian and other languages. See www.immunize.org/vis. Hojas de información sobre vacunas están disponibles en español y en muchos otros idiomas. Visite www.immunize.org/vis. 1. Why get vaccinated?     Influenza vaccine can prevent influenza (flu). Flu is a contagious disease that spreads around the [place] every year, usually between October and May. Anyone can get the flu, but it is more dangerous for some people. Infants and young children, people 72 years and older, pregnant people, and people with certain health conditions or a weakened immune system are at greatest risk of flu complications. Pneumonia, bronchitis, sinus infections, and ear infections are examples of flu-related complications. If you have a medical condition, such as heart disease, cancer, or diabetes, flu can make it worse. Flu can cause fever and chills, sore throat, muscle aches, fatigue, cough, headache, and runny or stuffy nose. Some people may have vomiting and diarrhea, though this is more common in children than adults. In an average year, thousands of people in the [facility] die from flu, and many more are hospitalized. Flu vaccine prevents millions of illnesses and flu-related visits to the doctor each year. 2. Influenza vaccines     CDC recommends everyone 6 months and older get vaccinated every flu season. Children 6 months through 6years of age may need 2 doses during a single flu season. Everyone else needs only 1 dose each flu season. It takes about 2 weeks for protection to develop after vaccination. There are many flu viruses, and they are always changing. Each year a new flu vaccine is made to protect against the influenza viruses believed to be likely to cause disease in the upcoming flu season. Even when the vaccine doesnt exactly match these viruses, it may still provide some protection. Influenza vaccine does not cause flu. Influenza vaccine may be given at the same time as other vaccines.     3. Talk with your health care provider    Tell your vaccination provider if the person getting the vaccine:   Has had an allergic reaction after a previous dose of influenza vaccine, or has any severe, life-threatening allergies    Has ever had Guillain-Barré Syndrome (also called GBS)    In some cases, your health care provider may decide to postpone influenza vaccination until a future visit. Influenza vaccine can be administered at any time during pregnancy. People who are or will be pregnant during influenza season should receive inactivated influenza vaccine. People with minor illnesses, such as a cold, may be vaccinated. People who are moderately or severely ill should usually wait until they recover before getting influenza vaccine. Your health care provider can give you more information. 4. Risks of a vaccine reaction     Soreness, redness, and swelling where the shot is given, fever, muscle aches, and headache can happen after influenza vaccination.  There may be a very small increased risk of Guillain-Barré Syndrome (GBS) after inactivated influenza vaccine (the flu shot). Leoma March children who get the flu shot along with pneumococcal vaccine (PCV13) and/or DTaP vaccine at the same time might be slightly more likely to have a seizure caused by fever. Tell your health care provider if a child who is getting flu vaccine has ever had a seizure. People sometimes faint after medical procedures, including vaccination. Tell your provider if you feel dizzy or have vision changes or ringing in the ears. As with any medicine, there is a very remote chance of a vaccine causing a severe allergic reaction, other serious injury, or death. 5. What if there is a serious problem? An allergic reaction could occur after the vaccinated person leaves the clinic. If you see signs of a severe allergic reaction (hives, swelling of the face and throat, difficulty breathing, a fast heartbeat, dizziness, or weakness), call 9-1-1 and get the person to the nearest hospital.    For other signs that concern you, call your health care provider.     Adverse reactions should be reported to the Vaccine Adverse Event Reporting System (VAERS). Your health care provider will usually file this report, or you can do it yourself. Visit the VAERS website at www.vaers. hhs.gov or call 0-245.859.6382. VAERS is only for reporting reactions, and VAERS staff members do not give medical advice. 6. The National Vaccine Injury Compensation Program    The [facility] Vaccine Injury Compensation Program (VICP) is a federal program that was created to compensate people who may have been injured by certain vaccines. Claims regarding alleged injury or death due to vaccination have a time limit for filing, which may be as short as two years. Visit the VICP website at www.[facility].gov/vaccinecompensation or call 8-244.867.4294 to learn about the program and about filing a claim. 7. How can I learn more?  Ask your health care provider.  Call your local or state health department.  Visit the website of the Food and Drug Administration (FDA) for vaccine package inserts and additional information at www.fda.gov/vaccines-blood-biologics/vaccines.  Contact the Centers for Disease Control and Prevention (CDC):  - Call 8-159.113.9498 (1-800-CDC-INFO) or  - Visit CDCs influenza website at www.cdc.gov/flu. Vaccine Information Statement   Inactivated Influenza Vaccine   8/6/2021  42 AJ Rollins [id number]   Department of Health and Human Services  Centers for Disease Control and Prevention    Office Use Only              Hasmukh Rainey M.D.

## 2021-10-21 LAB
ALBUMIN SERPL-MCNC: 3.9 G/DL (ref 3.5–5)
ALBUMIN/GLOB SERPL: 1 {RATIO} (ref 1.1–2.2)
ALP SERPL-CCNC: 119 U/L (ref 45–117)
ALT SERPL-CCNC: 53 U/L (ref 12–78)
ANION GAP SERPL CALC-SCNC: 7 MMOL/L (ref 5–15)
AST SERPL-CCNC: 50 U/L (ref 15–37)
BASOPHILS # BLD: 0.1 K/UL (ref 0–0.1)
BASOPHILS NFR BLD: 1 % (ref 0–1)
BILIRUB SERPL-MCNC: 0.4 MG/DL (ref 0.2–1)
BUN SERPL-MCNC: 16 MG/DL (ref 6–20)
BUN/CREAT SERPL: 15 (ref 12–20)
CALCIUM SERPL-MCNC: 9.7 MG/DL (ref 8.5–10.1)
CHLORIDE SERPL-SCNC: 106 MMOL/L (ref 97–108)
CHOLEST SERPL-MCNC: 231 MG/DL
CO2 SERPL-SCNC: 25 MMOL/L (ref 21–32)
CREAT SERPL-MCNC: 1.04 MG/DL (ref 0.7–1.3)
DIFFERENTIAL METHOD BLD: ABNORMAL
EOSINOPHIL # BLD: 0.2 K/UL (ref 0–0.4)
EOSINOPHIL NFR BLD: 3 % (ref 0–7)
ERYTHROCYTE [DISTWIDTH] IN BLOOD BY AUTOMATED COUNT: 12.5 % (ref 11.5–14.5)
EST. AVERAGE GLUCOSE BLD GHB EST-MCNC: 189 MG/DL
GLOBULIN SER CALC-MCNC: 4.1 G/DL (ref 2–4)
GLUCOSE SERPL-MCNC: 141 MG/DL (ref 65–100)
HBA1C MFR BLD: 8.2 % (ref 4–5.6)
HCT VFR BLD AUTO: 47.9 % (ref 36.6–50.3)
HDLC SERPL-MCNC: 27 MG/DL
HDLC SERPL: 8.6 {RATIO} (ref 0–5)
HGB BLD-MCNC: 16.2 G/DL (ref 12.1–17)
IMM GRANULOCYTES # BLD AUTO: 0 K/UL (ref 0–0.04)
IMM GRANULOCYTES NFR BLD AUTO: 1 % (ref 0–0.5)
LDLC SERPL CALC-MCNC: ABNORMAL MG/DL (ref 0–100)
LYMPHOCYTES # BLD: 1.5 K/UL (ref 0.8–3.5)
LYMPHOCYTES NFR BLD: 22 % (ref 12–49)
MCH RBC QN AUTO: 31.1 PG (ref 26–34)
MCHC RBC AUTO-ENTMCNC: 33.8 G/DL (ref 30–36.5)
MCV RBC AUTO: 91.9 FL (ref 80–99)
MONOCYTES # BLD: 0.5 K/UL (ref 0–1)
MONOCYTES NFR BLD: 7 % (ref 5–13)
NEUTS SEG # BLD: 4.4 K/UL (ref 1.8–8)
NEUTS SEG NFR BLD: 66 % (ref 32–75)
NRBC # BLD: 0 K/UL (ref 0–0.01)
NRBC BLD-RTO: 0 PER 100 WBC
PLATELET # BLD AUTO: 254 K/UL (ref 150–400)
PMV BLD AUTO: 10.7 FL (ref 8.9–12.9)
POTASSIUM SERPL-SCNC: 4.2 MMOL/L (ref 3.5–5.1)
PROT SERPL-MCNC: 8 G/DL (ref 6.4–8.2)
RBC # BLD AUTO: 5.21 M/UL (ref 4.1–5.7)
SODIUM SERPL-SCNC: 138 MMOL/L (ref 136–145)
TRIGL SERPL-MCNC: 501 MG/DL (ref ?–150)
TSH SERPL DL<=0.05 MIU/L-ACNC: 2.07 UIU/ML (ref 0.36–3.74)
VLDLC SERPL CALC-MCNC: ABNORMAL MG/DL
WBC # BLD AUTO: 6.6 K/UL (ref 4.1–11.1)

## 2021-10-21 RX ORDER — GLIMEPIRIDE 2 MG/1
2 TABLET ORAL
Qty: 90 TABLET | Refills: 1 | Status: SHIPPED | OUTPATIENT
Start: 2021-10-21 | End: 2021-10-27

## 2021-10-21 RX ORDER — METFORMIN HYDROCHLORIDE 500 MG/1
1500 TABLET, EXTENDED RELEASE ORAL
Qty: 270 TABLET | Refills: 1 | Status: SHIPPED | OUTPATIENT
Start: 2021-10-21 | End: 2021-10-27

## 2021-10-22 LAB
PSA SERPL-MCNC: 0.6 NG/ML (ref 0–4)
REFLEX CRITERIA: NORMAL

## 2021-10-26 NOTE — PROGRESS NOTES
Please contact patient regarding their mychart resulted labs. They have not reviewed them to date.       Dr. Lady Viera

## 2021-10-27 RX ORDER — METFORMIN HYDROCHLORIDE 500 MG/1
2000 TABLET, EXTENDED RELEASE ORAL
Qty: 360 TABLET | Refills: 1 | Status: SHIPPED | OUTPATIENT
Start: 2021-10-27 | End: 2022-05-31

## 2021-10-27 RX ORDER — GLIMEPIRIDE 4 MG/1
4 TABLET ORAL
Qty: 90 TABLET | Refills: 1 | Status: SHIPPED | OUTPATIENT
Start: 2021-10-27 | End: 2022-04-26

## 2021-10-27 NOTE — PROGRESS NOTES
Patient called and verified. Results/ recommendations reviewed with patient. Follow up appointment made as needed. Verbalizes understanding. Patient states he already started 3 metformins.  He takes metformin and glimepiride together in the am.He uses [facility]

## 2022-01-25 ENCOUNTER — OFFICE VISIT (OUTPATIENT)
Dept: FAMILY MEDICINE CLINIC | Age: 57
End: 2022-01-25
Payer: COMMERCIAL

## 2022-01-25 VITALS
SYSTOLIC BLOOD PRESSURE: 137 MMHG | HEART RATE: 70 BPM | HEIGHT: 69 IN | TEMPERATURE: 98.4 F | WEIGHT: 233 LBS | DIASTOLIC BLOOD PRESSURE: 86 MMHG | RESPIRATION RATE: 18 BRPM | OXYGEN SATURATION: 97 % | BODY MASS INDEX: 34.51 KG/M2

## 2022-01-25 DIAGNOSIS — E29.1 HYPOGONADISM MALE: ICD-10-CM

## 2022-01-25 DIAGNOSIS — E78.1 HYPERTRIGLYCERIDEMIA: ICD-10-CM

## 2022-01-25 DIAGNOSIS — E11.9 TYPE 2 DIABETES MELLITUS WITHOUT COMPLICATION, WITHOUT LONG-TERM CURRENT USE OF INSULIN (HCC): Primary | ICD-10-CM

## 2022-01-25 PROCEDURE — 99214 OFFICE O/P EST MOD 30 MIN: CPT | Performed by: FAMILY MEDICINE

## 2022-01-25 RX ORDER — TESTOSTERONE CYPIONATE 200 MG/ML
INJECTION INTRAMUSCULAR
Qty: 2 ML | Refills: 5 | Status: SHIPPED | OUTPATIENT
Start: 2022-01-25 | End: 2022-08-01

## 2022-01-25 RX ORDER — FENOFIBRATE 145 MG/1
TABLET, COATED ORAL
Qty: 90 TABLET | Refills: 1 | Status: SHIPPED | OUTPATIENT
Start: 2022-01-25 | End: 2022-07-27

## 2022-01-25 RX ORDER — CLOBETASOL PROPIONATE 0.5 MG/G
CREAM TOPICAL
Qty: 60 G | Refills: 2 | Status: SHIPPED | OUTPATIENT
Start: 2022-01-25

## 2022-01-25 NOTE — PROGRESS NOTES
Chief Complaint   Patient presents with    Diabetes     Patient presents in office today for diabetes check. He would like to clarify what dosages he is supposed to be taking of glimepiride and metformin. Also needs a refill of his T injection. No concerns. 1. Have you been to the ER, urgent care clinic since your last visit? Hospitalized since your last visit? No    2. Have you seen or consulted any other health care providers outside of the [address] [place] [address] since your last visit? Include any pap smears or colon screening. No    Learning Assessment 1/5/2018   PRIMARY LEARNER Patient   HIGHEST LEVEL OF EDUCATION - PRIMARY LEARNER  GRADUATED HIGH SCHOOL OR GED   BARRIERS PRIMARY LEARNER -   CO-LEARNER CAREGIVER -   PRIMARY LANGUAGE ENGLISH   LEARNER PREFERENCE PRIMARY DEMONSTRATION   ANSWERED BY patient   RELATIONSHIP SELF         Lab Results   Component Value Date/Time    Microalb/Creat ratio (ug/mg creat.) 6.3 12/04/2017 09:47 AM      Chief Complaint   Patient presents with    Diabetes     he is a 61y.o. year old male who presents for evaluation. See Diabetic Report Card listed above. Patient Active Problem List    Diagnosis    Allergy desensitization therapy    Diabetes mellitus ([facility] [address]    Hypertriglyceridemia    Finger(s) amputation status     TIPS TWO FINGERS      MOOK (obstructive sleep apnea)     CPAP      Hypogonadism male       Reviewed PmHx, RxHx, FmHx, SocHx, AllgHx--dated and updated in the chart. Review of Systems - negative except as listed above in the HPI    Objective:     Vitals:    01/25/22 0739   BP: 137/86   Pulse: 70   Resp: 18   Temp: 98.4 °F (36.9 °C)   TempSrc: Oral   SpO2: 97%   Weight: 233 lb (105.7 kg)   Height: 5' 9\" (1.753 m)         Assessment/ Plan:   Diagnoses and all orders for this visit:    1.  Type 2 diabetes mellitus without complication, without long-term current use of insulin (HCC)  -     testosterone cypionate (DEPOTESTOTERONE CYPIONATE) 200 mg/mL injection; INJECT 1 ML INTRAMUSCULARLY ONCE EVERY 2 WEEKS  -     clobetasoL (TEMOVATE) 0.05 % topical cream; APPLY TOPICALLY TO AFFECTED AREA TWO TIMES A DAY  -     LIPID PANEL; Future  -     METABOLIC PANEL, COMPREHENSIVE; Future  -     HEMOGLOBIN A1C WITH EAG; Future  A1c above goal.  Patient has made some dietary changes and is taking medications. Will make adjustments based on the set of labs. 2. Hypogonadism male  -     testosterone cypionate (DEPOTESTOTERONE CYPIONATE) 200 mg/mL injection; INJECT 1 ML INTRAMUSCULARLY ONCE EVERY 2 WEEKS    3. Hypertriglyceridemia  -     LIPID PANEL; Future  -     METABOLIC PANEL, COMPREHENSIVE; Future       Follow-up and Dispositions    · Return in about 6 months (around 7/25/2022). Lab Results   Component Value Date/Time    Cholesterol, total 231 (H) 10/20/2021 04:18 PM    HDL Cholesterol 27 10/20/2021 04:18 PM    LDL,Direct 141 (H) 10/20/2021 04:18 PM    LDL, calculated Not calculated due to elevated triglyceride level 10/20/2021 04:18 PM    Triglyceride 501 (H) 10/20/2021 04:18 PM    CHOL/HDL Ratio 8.6 (H) 10/20/2021 04:18 PM     Lab Results   Component Value Date/Time    Hemoglobin A1c 8.2 (H) 10/20/2021 04:18 PM    Hemoglobin A1c 7.7 (H) 11/24/2020 02:39 PM    Hemoglobin A1c 7.4 (H) 10/02/2019 08:27 AM    Microalb/Creat ratio (ug/mg creat.) 6.3 12/04/2017 09:47 AM    LDL,Direct 141 (H) 10/20/2021 04:18 PM    LDL, calculated Not calculated due to elevated triglyceride level 10/20/2021 04:18 PM    Creatinine 1.04 10/20/2021 04:18 PM          Discussed with patient goal of Diabetes to include:  HgA1C <7, LDL cholesterol <100, Blood pressure <140/80. Discussed with patient diet and weight management and to get regular exercise. Recommend yearly eye exams and daily foot care. The patient understands and agrees with the plan. I have discussed the diagnosis with the patient and the intended plan as seen in the above orders.   The patient has received an after-visit summary and questions were answered concerning future plans. Medication Side Effects and Warnings were discussed with patient  Patient Labs were reviewed and or requested  Patient Past Records were reviewed and or requested    Melody Rock M.D. [address] [facility]    There are no Patient Instructions on file for this visit.

## 2022-01-26 LAB
ALBUMIN SERPL-MCNC: 4.3 G/DL (ref 3.8–4.9)
ALBUMIN/GLOB SERPL: 1.5 {RATIO} (ref 1.2–2.2)
ALP SERPL-CCNC: 85 IU/L (ref 44–121)
ALT SERPL-CCNC: 24 IU/L (ref 0–44)
AST SERPL-CCNC: 22 IU/L (ref 0–40)
BILIRUB SERPL-MCNC: 0.3 MG/DL (ref 0–1.2)
BUN SERPL-MCNC: 13 MG/DL (ref 6–24)
BUN/CREAT SERPL: 14 (ref 9–20)
CALCIUM SERPL-MCNC: 9 MG/DL (ref 8.7–10.2)
CHLORIDE SERPL-SCNC: 107 MMOL/L (ref 96–106)
CHOLEST SERPL-MCNC: 194 MG/DL (ref 100–199)
CO2 SERPL-SCNC: 23 MMOL/L (ref 20–29)
CREAT SERPL-MCNC: 0.96 MG/DL (ref 0.76–1.27)
EST. AVERAGE GLUCOSE BLD GHB EST-MCNC: 137 MG/DL
GLOBULIN SER CALC-MCNC: 2.9 G/DL (ref 1.5–4.5)
GLUCOSE SERPL-MCNC: 88 MG/DL (ref 65–99)
HBA1C MFR BLD: 6.4 % (ref 4.8–5.6)
HDLC SERPL-MCNC: 36 MG/DL
IMP & REVIEW OF LAB RESULTS: NORMAL
LDLC SERPL CALC-MCNC: 132 MG/DL (ref 0–99)
POTASSIUM SERPL-SCNC: 4.2 MMOL/L (ref 3.5–5.2)
PROT SERPL-MCNC: 7.2 G/DL (ref 6–8.5)
SODIUM SERPL-SCNC: 144 MMOL/L (ref 134–144)
TRIGL SERPL-MCNC: 144 MG/DL (ref 0–149)
VLDLC SERPL CALC-MCNC: 26 MG/DL (ref 5–40)

## 2022-01-31 NOTE — PROGRESS NOTES
Please contact patient regarding their mychart resulted labs. They have not reviewed them to date.       Dr. Serjio Deras

## 2022-03-19 PROBLEM — Z51.6 ALLERGY DESENSITIZATION THERAPY: Status: ACTIVE | Noted: 2019-04-05

## 2022-04-18 RX ORDER — GLIMEPIRIDE 2 MG/1
TABLET ORAL
Qty: 90 TABLET | Refills: 1 | Status: SHIPPED | OUTPATIENT
Start: 2022-04-18

## 2022-04-26 RX ORDER — GLIMEPIRIDE 4 MG/1
TABLET ORAL
Qty: 90 TABLET | Refills: 1 | Status: SHIPPED | OUTPATIENT
Start: 2022-04-26

## 2022-05-31 RX ORDER — METFORMIN HYDROCHLORIDE 500 MG/1
TABLET, EXTENDED RELEASE ORAL
Qty: 270 TABLET | Refills: 1 | Status: SHIPPED | OUTPATIENT
Start: 2022-05-31

## 2022-06-02 RX ORDER — NEEDLES, SAFETY 22GX1 1/2"
NEEDLE, DISPOSABLE MISCELLANEOUS
Qty: 2 EACH | Refills: 5 | Status: SHIPPED | OUTPATIENT
Start: 2022-06-02

## 2022-07-27 RX ORDER — FENOFIBRATE 145 MG/1
TABLET, COATED ORAL
Qty: 90 TABLET | Refills: 1 | Status: SHIPPED | OUTPATIENT
Start: 2022-07-27

## 2022-07-29 DIAGNOSIS — E11.9 TYPE 2 DIABETES MELLITUS WITHOUT COMPLICATION, WITHOUT LONG-TERM CURRENT USE OF INSULIN (HCC): ICD-10-CM

## 2022-07-29 DIAGNOSIS — E29.1 HYPOGONADISM MALE: ICD-10-CM

## 2022-08-01 RX ORDER — TESTOSTERONE CYPIONATE 200 MG/ML
INJECTION INTRAMUSCULAR
Qty: 2 ML | Refills: 0 | Status: SHIPPED | OUTPATIENT
Start: 2022-08-01 | End: 2022-09-12

## 2022-10-19 DIAGNOSIS — E11.9 TYPE 2 DIABETES MELLITUS WITHOUT COMPLICATION, WITHOUT LONG-TERM CURRENT USE OF INSULIN (HCC): ICD-10-CM

## 2022-10-19 DIAGNOSIS — E29.1 HYPOGONADISM MALE: ICD-10-CM

## 2022-10-20 RX ORDER — TESTOSTERONE CYPIONATE 200 MG/ML
INJECTION INTRAMUSCULAR
Qty: 2 ML | Refills: 0 | OUTPATIENT
Start: 2022-10-20

## 2022-11-18 DIAGNOSIS — E29.1 HYPOGONADISM MALE: ICD-10-CM

## 2022-11-18 DIAGNOSIS — E11.9 TYPE 2 DIABETES MELLITUS WITHOUT COMPLICATION, WITHOUT LONG-TERM CURRENT USE OF INSULIN (HCC): ICD-10-CM

## 2022-11-21 RX ORDER — TESTOSTERONE CYPIONATE 200 MG/ML
INJECTION INTRAMUSCULAR
Qty: 2 ML | Refills: 0 | OUTPATIENT
Start: 2022-11-21

## 2023-01-29 RX ORDER — FENOFIBRATE 145 MG/1
TABLET, COATED ORAL
Qty: 90 TABLET | Refills: 1 | Status: SHIPPED | OUTPATIENT
Start: 2023-01-29

## 2023-01-30 RX ORDER — METFORMIN HYDROCHLORIDE 500 MG/1
TABLET, EXTENDED RELEASE ORAL
Qty: 270 TABLET | Refills: 1 | OUTPATIENT
Start: 2023-01-30

## 2023-02-14 RX ORDER — GLIMEPIRIDE 4 MG/1
TABLET ORAL
Qty: 90 TABLET | Refills: 1 | Status: SHIPPED | OUTPATIENT
Start: 2023-02-14

## 2023-03-09 ENCOUNTER — OFFICE VISIT (OUTPATIENT)
Dept: FAMILY MEDICINE CLINIC | Age: 58
End: 2023-03-09

## 2023-03-09 VITALS
TEMPERATURE: 98.4 F | RESPIRATION RATE: 20 BRPM | BODY MASS INDEX: 33.92 KG/M2 | OXYGEN SATURATION: 97 % | HEART RATE: 81 BPM | HEIGHT: 69 IN | DIASTOLIC BLOOD PRESSURE: 87 MMHG | WEIGHT: 229 LBS | SYSTOLIC BLOOD PRESSURE: 128 MMHG

## 2023-03-09 DIAGNOSIS — Z00.00 ROUTINE GENERAL MEDICAL EXAMINATION AT A HEALTH CARE FACILITY: Primary | ICD-10-CM

## 2023-03-09 DIAGNOSIS — Z12.5 SCREENING PSA (PROSTATE SPECIFIC ANTIGEN): ICD-10-CM

## 2023-03-09 DIAGNOSIS — E11.9 TYPE 2 DIABETES MELLITUS WITHOUT COMPLICATION, WITHOUT LONG-TERM CURRENT USE OF INSULIN (HCC): ICD-10-CM

## 2023-03-09 DIAGNOSIS — E78.1 HYPERTRIGLYCERIDEMIA: ICD-10-CM

## 2023-03-09 RX ORDER — METFORMIN HYDROCHLORIDE 500 MG/1
2000 TABLET, EXTENDED RELEASE ORAL
Qty: 360 TABLET | Refills: 1 | Status: SHIPPED | OUTPATIENT
Start: 2023-03-09

## 2023-03-09 NOTE — PROGRESS NOTES
Subjective:   Branden Garcia is a 61 y.o. male presenting for his annual checkup. ROS:  Feeling well. No dyspnea or chest pain on exertion. No abdominal pain, change in bowel habits, black or bloody stools. No urinary tract or prostatic symptoms. No neurological complaints. Specific concerns today: none. Patient Active Problem List    Diagnosis Date Noted    Allergy desensitization therapy 2019    Diabetes mellitus ([address] 2012    Hypertriglyceridemia 2012    Finger(s) amputation status 2010    MOOK (obstructive sleep apnea) 2010    Hypogonadism male 2010     Current Outpatient Medications   Medication Sig Dispense Refill    metFORMIN ER (GLUCOPHAGE XR) 500 mg tablet Take 4 Tablets by mouth daily (with dinner). 360 Tablet 1    canagliflozin (Invokana) 300 mg tablet TAKE 1 TABLET BY MOUTH EVERY DAY 90 Tablet 3    glimepiride (AMARYL) 4 mg tablet TAKE 1 TABLET BY MOUTH EVERY DAY IN THE MORNING 90 Tablet 1    fenofibrate nanocrystallized (TRICOR) 145 mg tablet TAKE 1 TABLET BY MOUTH EVERY DAY 90 Tablet 1    BD SafetyGlide Syringe 3 mL 23 x 1\" syrg TO BE USED WITH TESTOSTERONE INJECTION EVERY 2 WEEKS 2 Each 5    clobetasoL (TEMOVATE) 0.05 % topical cream APPLY TOPICALLY TO AFFECTED AREA TWO TIMES A DAY 60 g 2    glucose blood VI test strips (ASCENSIA AUTODISC VI, ONE TOUCH ULTRA TEST VI) strip Bid monitoring dx: E11.9 100 Strip 5    lancets misc Bid monitoring dx: E11.9 100 Lancet 5    Blood-Glucose Meter (ONE TOUCH ULTRA SYSTEM KIT) monitoring kit by Does Not Apply route. Dx: 250.00   1 Kit 0     Family History   Problem Relation Age of Onset    No Known Problems Mother      Social History     Tobacco Use    Smoking status: Former     Packs/day: 1.00     Years: 10.00     Pack years: 10.00     Types: Cigarettes     Quit date:      Years since quittin.2    Smokeless tobacco: Never   Substance Use Topics    Alcohol use:  Yes     Alcohol/week: 0.0 standard drinks Comment: occas             Objective:     Visit Vitals  /87 (BP 1 Location: Left upper arm, BP Patient Position: Sitting)   Pulse 81   Temp 98.4 °F (36.9 °C) (Oral)   Resp 20   Ht 5' 9\" (1.753 m)   Wt 229 lb (103.9 kg)   SpO2 97%   BMI 33.82 kg/m²     The patient appears well, alert, oriented x 3, in no distress. ENT normal.  Neck supple. No adenopathy or thyromegaly. DEVON. Lungs are clear, good air entry, no wheezes, rhonchi or rales. S1 and S2 normal, no murmurs, regular rate and rhythm. Abdomen is soft without tenderness, guarding, mass or organomegaly.  exam: deferred. Extremities show no edema, normal peripheral pulses. Neurological is normal without focal findings. Assessment/Plan:   healthy adult male  lose weight, increase physical activity, follow low fat diet, follow low salt diet, routine labs ordered. Encounter Diagnoses   Name Primary? Routine general medical examination at a health care facility Yes    Type 2 diabetes mellitus without complication, without long-term current use of insulin (HCC)     Hypertriglyceridemia     Screening PSA (prostate specific antigen)      Orders Placed This Encounter    LIPID PANEL    TSH 3RD GENERATION    METABOLIC PANEL, COMPREHENSIVE    CBC WITH AUTOMATED DIFF    HEMOGLOBIN A1C WITH EAG    MICROALBUMIN, UR, RAND W/ MICROALB/CREAT RATIO    PSA SCREENING (SCREENING)    metFORMIN ER (GLUCOPHAGE XR) 500 mg tablet    canagliflozin (Invokana) 300 mg tablet   . I have discussed the diagnosis with the patient and the intended plan as seen in the above orders. The patient has received an after-visit summary and questions were answered concerning future plans. Patient conveyed understanding of the plan at the time of the visit.     Eric Martínez, MSN, ANP  3/9/2023

## 2023-03-09 NOTE — PROGRESS NOTES
Chief Complaint   Patient presents with    Follow-up    Diabetes     Pt being seen for fuv for diabetes   -labs    Pt states he has not been able to get his testosterone     1. Have you been to the ER, urgent care clinic since your last visit? Hospitalized since your last visit? No    2. Have you seen or consulted any other health care providers outside of the [address] [place] [address] since your last visit? Include any pap smears or colon screening.  No    Pt has no other concerns

## 2023-03-10 LAB
ALBUMIN SERPL-MCNC: 4.2 G/DL (ref 3.5–5)
ALBUMIN/GLOB SERPL: 0.9 (ref 1.1–2.2)
ALP SERPL-CCNC: 96 U/L (ref 45–117)
ALT SERPL-CCNC: 43 U/L (ref 12–78)
ANION GAP SERPL CALC-SCNC: 8 MMOL/L (ref 5–15)
AST SERPL-CCNC: 28 U/L (ref 15–37)
BASOPHILS # BLD: 0.1 K/UL (ref 0–0.1)
BASOPHILS NFR BLD: 1 % (ref 0–1)
BILIRUB SERPL-MCNC: 0.3 MG/DL (ref 0.2–1)
BUN SERPL-MCNC: 15 MG/DL (ref 6–20)
BUN/CREAT SERPL: 15 (ref 12–20)
CALCIUM SERPL-MCNC: 9.5 MG/DL (ref 8.5–10.1)
CHLORIDE SERPL-SCNC: 103 MMOL/L (ref 97–108)
CHOLEST SERPL-MCNC: 207 MG/DL
CO2 SERPL-SCNC: 27 MMOL/L (ref 21–32)
CREAT SERPL-MCNC: 0.98 MG/DL (ref 0.7–1.3)
DIFFERENTIAL METHOD BLD: ABNORMAL
EOSINOPHIL # BLD: 0.3 K/UL (ref 0–0.4)
EOSINOPHIL NFR BLD: 3 % (ref 0–7)
ERYTHROCYTE [DISTWIDTH] IN BLOOD BY AUTOMATED COUNT: 13 % (ref 11.5–14.5)
EST. AVERAGE GLUCOSE BLD GHB EST-MCNC: 134 MG/DL
GLOBULIN SER CALC-MCNC: 4.7 G/DL (ref 2–4)
GLUCOSE SERPL-MCNC: 96 MG/DL (ref 65–100)
HBA1C MFR BLD: 6.3 % (ref 4–5.6)
HCT VFR BLD AUTO: 53.3 % (ref 36.6–50.3)
HDLC SERPL-MCNC: 41 MG/DL
HDLC SERPL: 5 (ref 0–5)
HGB BLD-MCNC: 16.8 G/DL (ref 12.1–17)
IMM GRANULOCYTES # BLD AUTO: 0.1 K/UL (ref 0–0.04)
IMM GRANULOCYTES NFR BLD AUTO: 1 % (ref 0–0.5)
LDLC SERPL CALC-MCNC: 131.4 MG/DL (ref 0–100)
LYMPHOCYTES # BLD: 1 K/UL (ref 0.8–3.5)
LYMPHOCYTES NFR BLD: 12 % (ref 12–49)
MCH RBC QN AUTO: 29.3 PG (ref 26–34)
MCHC RBC AUTO-ENTMCNC: 31.5 G/DL (ref 30–36.5)
MCV RBC AUTO: 92.9 FL (ref 80–99)
MONOCYTES # BLD: 0.5 K/UL (ref 0–1)
MONOCYTES NFR BLD: 6 % (ref 5–13)
NEUTS SEG # BLD: 6.6 K/UL (ref 1.8–8)
NEUTS SEG NFR BLD: 77 % (ref 32–75)
NRBC # BLD: 0 K/UL (ref 0–0.01)
NRBC BLD-RTO: 0 PER 100 WBC
PLATELET # BLD AUTO: 282 K/UL (ref 150–400)
PMV BLD AUTO: 10.3 FL (ref 8.9–12.9)
POTASSIUM SERPL-SCNC: 4.5 MMOL/L (ref 3.5–5.1)
PROT SERPL-MCNC: 8.9 G/DL (ref 6.4–8.2)
PSA SERPL-MCNC: 0.5 NG/ML (ref 0.01–4)
RBC # BLD AUTO: 5.74 M/UL (ref 4.1–5.7)
SODIUM SERPL-SCNC: 138 MMOL/L (ref 136–145)
TRIGL SERPL-MCNC: 173 MG/DL (ref ?–150)
TSH SERPL DL<=0.05 MIU/L-ACNC: 2.73 UIU/ML (ref 0.36–3.74)
VLDLC SERPL CALC-MCNC: 34.6 MG/DL
WBC # BLD AUTO: 8.5 K/UL (ref 4.1–11.1)

## 2023-08-01 RX ORDER — FENOFIBRATE 145 MG/1
TABLET, COATED ORAL
Qty: 90 TABLET | Refills: 1 | Status: SHIPPED | OUTPATIENT
Start: 2023-08-01

## 2023-08-20 RX ORDER — GLIMEPIRIDE 4 MG/1
TABLET ORAL
Qty: 90 TABLET | Refills: 1 | Status: SHIPPED | OUTPATIENT
Start: 2023-08-20

## 2023-09-12 RX ORDER — METFORMIN HYDROCHLORIDE 500 MG/1
TABLET, EXTENDED RELEASE ORAL
Qty: 360 TABLET | Refills: 1 | Status: SHIPPED | OUTPATIENT
Start: 2023-09-12

## 2023-09-22 ENCOUNTER — TELEPHONE (OUTPATIENT)
Age: 58
End: 2023-09-22

## 2023-09-22 NOTE — TELEPHONE ENCOUNTER
Nasrin Huitron from  [facility])called and states that patient needs aPA for the Invokana. She states that she has started the process and would like to advise how to finish it on [employer]. Please do not use www in the email address. She states once you see the page with the couple on the beach, there is no login or registration needed. She states to go to the Existing pa tab, (third tab on top), Then put in the PA id number. It is an EOC number. That number is [id number]. She states that will do the PA. She also states that their fax number needs to be included. [phone].  Her phone number is [phone]

## 2023-10-09 ENCOUNTER — CLINICAL DOCUMENTATION (OUTPATIENT)
Age: 58
End: 2023-10-09

## 2024-01-23 RX ORDER — FENOFIBRATE 145 MG/1
TABLET, COATED ORAL
Qty: 90 TABLET | Refills: 1 | Status: SHIPPED | OUTPATIENT
Start: 2024-01-23

## 2024-02-13 ENCOUNTER — TELEPHONE (OUTPATIENT)
Age: 59
End: 2024-02-13

## 2024-02-14 RX ORDER — GLIMEPIRIDE 4 MG/1
TABLET ORAL
Qty: 90 TABLET | Refills: 1 | OUTPATIENT
Start: 2024-02-14

## 2024-02-28 RX ORDER — GLIMEPIRIDE 4 MG/1
4 TABLET ORAL EVERY MORNING
Qty: 90 TABLET | Refills: 0 | Status: SHIPPED | OUTPATIENT
Start: 2024-02-28

## 2024-02-28 NOTE — TELEPHONE ENCOUNTER
Pt called in and made an apt with you for 3.26.24, asking if you could refill to last until apt please, thanks.

## 2024-03-14 RX ORDER — METFORMIN HYDROCHLORIDE 500 MG/1
TABLET, EXTENDED RELEASE ORAL
Qty: 360 TABLET | Refills: 1 | Status: SHIPPED | OUTPATIENT
Start: 2024-03-14

## 2024-03-26 ENCOUNTER — OFFICE VISIT (OUTPATIENT)
Age: 59
End: 2024-03-26
Payer: COMMERCIAL

## 2024-03-26 VITALS
HEIGHT: 69 IN | RESPIRATION RATE: 18 BRPM | OXYGEN SATURATION: 95 % | WEIGHT: 233.3 LBS | TEMPERATURE: 98.4 F | DIASTOLIC BLOOD PRESSURE: 89 MMHG | SYSTOLIC BLOOD PRESSURE: 141 MMHG | HEART RATE: 84 BPM | BODY MASS INDEX: 34.55 KG/M2

## 2024-03-26 DIAGNOSIS — Z00.00 WELL EXAM WITHOUT ABNORMAL FINDINGS OF PATIENT 18 YEARS OF AGE OR OLDER: Primary | ICD-10-CM

## 2024-03-26 DIAGNOSIS — E78.00 HYPERCHOLESTEROLEMIA: ICD-10-CM

## 2024-03-26 DIAGNOSIS — E11.9 TYPE 2 DIABETES MELLITUS WITHOUT COMPLICATION, WITHOUT LONG-TERM CURRENT USE OF INSULIN (HCC): ICD-10-CM

## 2024-03-26 DIAGNOSIS — I10 PRIMARY HYPERTENSION: ICD-10-CM

## 2024-03-26 DIAGNOSIS — Z12.5 SCREENING PSA (PROSTATE SPECIFIC ANTIGEN): ICD-10-CM

## 2024-03-26 PROCEDURE — 99396 PREV VISIT EST AGE 40-64: CPT | Performed by: NURSE PRACTITIONER

## 2024-03-26 PROCEDURE — 3079F DIAST BP 80-89 MM HG: CPT | Performed by: NURSE PRACTITIONER

## 2024-03-26 PROCEDURE — 3077F SYST BP >= 140 MM HG: CPT | Performed by: NURSE PRACTITIONER

## 2024-03-26 RX ORDER — VALSARTAN 80 MG/1
80 TABLET ORAL DAILY
Qty: 90 TABLET | Refills: 3 | Status: SHIPPED | OUTPATIENT
Start: 2024-03-26

## 2024-03-26 RX ORDER — METFORMIN HYDROCHLORIDE 500 MG/1
TABLET, EXTENDED RELEASE ORAL
Qty: 360 TABLET | Refills: 3 | Status: SHIPPED | OUTPATIENT
Start: 2024-03-26

## 2024-03-26 RX ORDER — FENOFIBRATE 145 MG/1
145 TABLET, COATED ORAL DAILY
Qty: 90 TABLET | Refills: 3 | Status: SHIPPED | OUTPATIENT
Start: 2024-03-26

## 2024-03-26 RX ORDER — GLIMEPIRIDE 4 MG/1
4 TABLET ORAL EVERY MORNING
Qty: 90 TABLET | Refills: 3 | Status: SHIPPED | OUTPATIENT
Start: 2024-03-26

## 2024-03-26 SDOH — ECONOMIC STABILITY: FOOD INSECURITY: WITHIN THE PAST 12 MONTHS, THE FOOD YOU BOUGHT JUST DIDN'T LAST AND YOU DIDN'T HAVE MONEY TO GET MORE.: NEVER TRUE

## 2024-03-26 SDOH — ECONOMIC STABILITY: FOOD INSECURITY: WITHIN THE PAST 12 MONTHS, YOU WORRIED THAT YOUR FOOD WOULD RUN OUT BEFORE YOU GOT MONEY TO BUY MORE.: NEVER TRUE

## 2024-03-26 SDOH — ECONOMIC STABILITY: HOUSING INSECURITY
IN THE LAST 12 MONTHS, WAS THERE A TIME WHEN YOU DID NOT HAVE A STEADY PLACE TO SLEEP OR SLEPT IN A SHELTER (INCLUDING NOW)?: NO

## 2024-03-26 SDOH — ECONOMIC STABILITY: INCOME INSECURITY: HOW HARD IS IT FOR YOU TO PAY FOR THE VERY BASICS LIKE FOOD, HOUSING, MEDICAL CARE, AND HEATING?: NOT HARD AT ALL

## 2024-03-26 ASSESSMENT — PATIENT HEALTH QUESTIONNAIRE - PHQ9
SUM OF ALL RESPONSES TO PHQ QUESTIONS 1-9: 0
SUM OF ALL RESPONSES TO PHQ9 QUESTIONS 1 & 2: 0
SUM OF ALL RESPONSES TO PHQ QUESTIONS 1-9: 0
1. LITTLE INTEREST OR PLEASURE IN DOING THINGS: NOT AT ALL
2. FEELING DOWN, DEPRESSED OR HOPELESS: NOT AT ALL

## 2024-03-26 NOTE — PROGRESS NOTES
Chief Complaint   Patient presents with    Medication Refill    Check-Up     Patient is in the office today for a check up and med refills.  No other concerns.    \"Have you been to the ER, urgent care clinic since your last visit?  Hospitalized since your last visit?\"    NO    “Have you seen or consulted any other health care providers outside of [facility] since your last visit?”    NO        “Have you had a colorectal cancer screening such as a colonoscopy/FIT/Cologuard?    NO    Date of last Colonoscopy: 4/20/2005  No cologuard on file  No FIT/FOBT on file   No flexible sigmoidoscopy on file         Click Here for Release of Records Request   
Future    Type 2 diabetes mellitus without complication, without long-term current use of insulin (HCC)  -     Microalbumin / Creatinine Urine Ratio; Future  -     Hemoglobin A1C; Future  -     glimepiride (AMARYL) 4 MG tablet; Take 1 tablet by mouth every morning  -     metFORMIN (GLUCOPHAGE-XR) 500 MG extended release tablet; TAKE 4 TABLETS BY MOUTH DAILY (WITH DINNER).  -     canagliflozin (INVOKANA) 300 MG TABS tablet; Take 1 tablet by mouth daily    Hypercholesterolemia  -     Lipid Panel; Future  -     fenofibrate (TRICOR) 145 MG tablet; Take 1 tablet by mouth daily    Screening PSA (prostate specific antigen)  -     PSA Screening; Future    Primary hypertension  -     valsartan (DIOVAN) 80 MG tablet; Take 1 tablet by mouth daily        Labs updated today  Dev plan with results  Start diovan 80mg one a day for bp and renal protection  I have discussed the diagnosis with the patient and the intended plan as seen in the above orders. The patient has received an after-visit summary and questions were answered concerning future plans. Patient conveyed understanding of the plan at the time of the visit.    Lillian Ramey, MSN, ANP  3/26/2024      --Lillian Ramey APRN - NP

## 2024-03-27 LAB
ALBUMIN SERPL-MCNC: 4.3 G/DL (ref 3.5–5)
ALBUMIN/GLOB SERPL: 1.1 (ref 1.1–2.2)
ALP SERPL-CCNC: 119 U/L (ref 45–117)
ALT SERPL-CCNC: 39 U/L (ref 12–78)
ANION GAP SERPL CALC-SCNC: 4 MMOL/L (ref 5–15)
AST SERPL-CCNC: 21 U/L (ref 15–37)
BASOPHILS # BLD: 0.1 K/UL (ref 0–0.1)
BASOPHILS NFR BLD: 1 % (ref 0–1)
BILIRUB SERPL-MCNC: 0.3 MG/DL (ref 0.2–1)
BUN SERPL-MCNC: 14 MG/DL (ref 6–20)
BUN/CREAT SERPL: 13 (ref 12–20)
CALCIUM SERPL-MCNC: 9.4 MG/DL (ref 8.5–10.1)
CHLORIDE SERPL-SCNC: 102 MMOL/L (ref 97–108)
CHOLEST SERPL-MCNC: 214 MG/DL
CO2 SERPL-SCNC: 30 MMOL/L (ref 21–32)
CREAT SERPL-MCNC: 1.12 MG/DL (ref 0.7–1.3)
CREAT UR-MCNC: 20 MG/DL
DIFFERENTIAL METHOD BLD: ABNORMAL
EOSINOPHIL # BLD: 0.3 K/UL (ref 0–0.4)
EOSINOPHIL NFR BLD: 5 % (ref 0–7)
ERYTHROCYTE [DISTWIDTH] IN BLOOD BY AUTOMATED COUNT: 13 % (ref 11.5–14.5)
EST. AVERAGE GLUCOSE BLD GHB EST-MCNC: 137 MG/DL
GLOBULIN SER CALC-MCNC: 4 G/DL (ref 2–4)
GLUCOSE SERPL-MCNC: 190 MG/DL (ref 65–100)
HBA1C MFR BLD: 6.4 % (ref 4–5.6)
HCT VFR BLD AUTO: 50.4 % (ref 36.6–50.3)
HDLC SERPL-MCNC: 36 MG/DL
HDLC SERPL: 5.9 (ref 0–5)
HGB BLD-MCNC: 15.9 G/DL (ref 12.1–17)
IMM GRANULOCYTES # BLD AUTO: 0.1 K/UL (ref 0–0.04)
IMM GRANULOCYTES NFR BLD AUTO: 1 % (ref 0–0.5)
LDLC SERPL CALC-MCNC: 120.4 MG/DL (ref 0–100)
LYMPHOCYTES # BLD: 1.8 K/UL (ref 0.8–3.5)
LYMPHOCYTES NFR BLD: 30 % (ref 12–49)
MCH RBC QN AUTO: 29.2 PG (ref 26–34)
MCHC RBC AUTO-ENTMCNC: 31.5 G/DL (ref 30–36.5)
MCV RBC AUTO: 92.6 FL (ref 80–99)
MICROALBUMIN UR-MCNC: <0.5 MG/DL
MICROALBUMIN/CREAT UR-RTO: <25 MG/G (ref 0–30)
MONOCYTES # BLD: 0.6 K/UL (ref 0–1)
MONOCYTES NFR BLD: 9 % (ref 5–13)
NEUTS SEG # BLD: 3.3 K/UL (ref 1.8–8)
NEUTS SEG NFR BLD: 54 % (ref 32–75)
NRBC # BLD: 0 K/UL (ref 0–0.01)
NRBC BLD-RTO: 0 PER 100 WBC
PLATELET # BLD AUTO: 253 K/UL (ref 150–400)
PMV BLD AUTO: 10.5 FL (ref 8.9–12.9)
POTASSIUM SERPL-SCNC: 4 MMOL/L (ref 3.5–5.1)
PROT SERPL-MCNC: 8.3 G/DL (ref 6.4–8.2)
PSA SERPL-MCNC: 0.5 NG/ML (ref 0.01–4)
RBC # BLD AUTO: 5.44 M/UL (ref 4.1–5.7)
SODIUM SERPL-SCNC: 136 MMOL/L (ref 136–145)
TRIGL SERPL-MCNC: 288 MG/DL
TSH SERPL DL<=0.05 MIU/L-ACNC: 2.55 UIU/ML (ref 0.36–3.74)
VLDLC SERPL CALC-MCNC: 57.6 MG/DL
WBC # BLD AUTO: 6 K/UL (ref 4.1–11.1)

## 2024-04-07 RX ORDER — ROSUVASTATIN CALCIUM 5 MG/1
5 TABLET, COATED ORAL NIGHTLY
Qty: 90 TABLET | Refills: 3 | Status: SHIPPED | OUTPATIENT
Start: 2024-04-07

## 2024-05-25 ENCOUNTER — HOSPITAL ENCOUNTER (EMERGENCY)
Facility: HOSPITAL | Age: 59
Discharge: HOME | End: 2024-05-25
Attending: STUDENT IN AN ORGANIZED HEALTH CARE EDUCATION/TRAINING PROGRAM
Payer: COMMERCIAL

## 2024-05-25 VITALS
HEART RATE: 75 BPM | HEIGHT: 69 IN | WEIGHT: 220 LBS | DIASTOLIC BLOOD PRESSURE: 103 MMHG | BODY MASS INDEX: 32.58 KG/M2 | SYSTOLIC BLOOD PRESSURE: 147 MMHG | TEMPERATURE: 98.9 F | RESPIRATION RATE: 16 BRPM | OXYGEN SATURATION: 97 %

## 2024-05-25 DIAGNOSIS — R21 RASH AND OTHER NONSPECIFIC SKIN ERUPTION: ICD-10-CM

## 2024-05-25 DIAGNOSIS — R22.0 SWELLING OF BOTH LIPS: Primary | ICD-10-CM

## 2024-05-25 LAB
ALBUMIN SERPL-MCNC: 4.5 G/DL (ref 3.5–5.2)
ALBUMIN/GLOB SERPL: 1.3 (ref 1.1–2.2)
ALP SERPL-CCNC: 93 U/L (ref 40–129)
ALT SERPL-CCNC: 42 U/L (ref 10–50)
ANION GAP SERPL CALC-SCNC: 10 MMOL/L (ref 5–15)
AST SERPL-CCNC: 43 U/L (ref 10–50)
BASOPHILS # BLD: 0.1 K/UL (ref 0–1)
BASOPHILS NFR BLD: 1 % (ref 0–1)
BILIRUB SERPL-MCNC: 0.5 MG/DL (ref 0.2–1)
BUN SERPL-MCNC: 13 MG/DL (ref 8–23)
BUN/CREAT SERPL: 15 (ref 12–20)
CALCIUM SERPL-MCNC: 9.6 MG/DL (ref 8.8–10.2)
CHLORIDE SERPL-SCNC: 104 MMOL/L (ref 98–107)
CO2 SERPL-SCNC: 26 MMOL/L (ref 22–29)
CREAT SERPL-MCNC: 0.88 MG/DL (ref 0.7–1.2)
DIFFERENTIAL METHOD BLD: NORMAL
EOSINOPHIL # BLD: 0.3 K/UL (ref 0–0.4)
EOSINOPHIL NFR BLD: 6 % (ref 0–7)
ERYTHROCYTE [DISTWIDTH] IN BLOOD BY AUTOMATED COUNT: 13.2 % (ref 11.5–14.5)
GLOBULIN SER CALC-MCNC: 3.6 G/DL (ref 2–4)
GLUCOSE SERPL-MCNC: 86 MG/DL (ref 65–100)
HCT VFR BLD AUTO: 46.1 % (ref 36.6–50.3)
HGB BLD-MCNC: 15.5 G/DL (ref 12.1–17)
IMM GRANULOCYTES # BLD AUTO: 0 K/UL (ref 0–0.04)
IMM GRANULOCYTES NFR BLD AUTO: 0 % (ref 0–0.5)
LYMPHOCYTES # BLD: 1.6 K/UL (ref 0.8–3.5)
LYMPHOCYTES NFR BLD: 31 % (ref 12–49)
MCH RBC QN AUTO: 30.2 PG (ref 26–34)
MCHC RBC AUTO-ENTMCNC: 33.6 G/DL (ref 30–36.5)
MCV RBC AUTO: 89.9 FL (ref 80–99)
MONOCYTES # BLD: 0.5 K/UL (ref 0–1)
MONOCYTES NFR BLD: 10 % (ref 5–13)
NEUTS SEG # BLD: 2.8 K/UL (ref 1.8–8)
NEUTS SEG NFR BLD: 52 % (ref 32–75)
NRBC # BLD: 0 K/UL (ref 0–0.01)
NRBC BLD-RTO: 0 PER 100 WBC
PLATELET # BLD AUTO: 222 K/UL (ref 150–400)
PMV BLD AUTO: 10.6 FL (ref 8.9–12.9)
POTASSIUM SERPL-SCNC: 4.1 MMOL/L (ref 3.5–5.1)
PROT SERPL-MCNC: 8.1 G/DL (ref 6.4–8.3)
RBC # BLD AUTO: 5.13 M/UL (ref 4.1–5.7)
SODIUM SERPL-SCNC: 140 MMOL/L (ref 136–145)
WBC # BLD AUTO: 5.3 K/UL (ref 4.1–11.1)

## 2024-05-25 PROCEDURE — 2500000003 HC RX 250 WO HCPCS: Performed by: STUDENT IN AN ORGANIZED HEALTH CARE EDUCATION/TRAINING PROGRAM

## 2024-05-25 PROCEDURE — 96375 TX/PRO/DX INJ NEW DRUG ADDON: CPT

## 2024-05-25 PROCEDURE — 80053 COMPREHEN METABOLIC PANEL: CPT

## 2024-05-25 PROCEDURE — 2580000003 HC RX 258: Performed by: STUDENT IN AN ORGANIZED HEALTH CARE EDUCATION/TRAINING PROGRAM

## 2024-05-25 PROCEDURE — 99284 EMERGENCY DEPT VISIT MOD MDM: CPT

## 2024-05-25 PROCEDURE — A4216 STERILE WATER/SALINE, 10 ML: HCPCS | Performed by: STUDENT IN AN ORGANIZED HEALTH CARE EDUCATION/TRAINING PROGRAM

## 2024-05-25 PROCEDURE — 96374 THER/PROPH/DIAG INJ IV PUSH: CPT

## 2024-05-25 PROCEDURE — 36415 COLL VENOUS BLD VENIPUNCTURE: CPT

## 2024-05-25 PROCEDURE — 85025 COMPLETE CBC W/AUTO DIFF WBC: CPT

## 2024-05-25 PROCEDURE — 6360000002 HC RX W HCPCS: Performed by: STUDENT IN AN ORGANIZED HEALTH CARE EDUCATION/TRAINING PROGRAM

## 2024-05-25 RX ORDER — PREDNISONE 50 MG/1
50 TABLET ORAL DAILY
Qty: 5 TABLET | Refills: 0 | Status: SHIPPED | OUTPATIENT
Start: 2024-05-25 | End: 2024-05-30

## 2024-05-25 RX ORDER — DIPHENHYDRAMINE HYDROCHLORIDE 50 MG/ML
50 INJECTION INTRAMUSCULAR; INTRAVENOUS
Status: COMPLETED | OUTPATIENT
Start: 2024-05-25 | End: 2024-05-25

## 2024-05-25 RX ADMIN — METHYLPREDNISOLONE SODIUM SUCCINATE 125 MG: 125 INJECTION, POWDER, FOR SOLUTION INTRAMUSCULAR; INTRAVENOUS at 14:44

## 2024-05-25 RX ADMIN — FAMOTIDINE 20 MG: 10 INJECTION, SOLUTION INTRAVENOUS at 14:43

## 2024-05-25 RX ADMIN — DIPHENHYDRAMINE HYDROCHLORIDE 50 MG: 50 INJECTION, SOLUTION INTRAMUSCULAR; INTRAVENOUS at 14:44

## 2024-05-25 ASSESSMENT — PAIN - FUNCTIONAL ASSESSMENT: PAIN_FUNCTIONAL_ASSESSMENT: NONE - DENIES PAIN

## 2024-05-25 NOTE — ED PROVIDER NOTES
canagliflozin (INVOKANA) 300 MG TABS tablet Take 1 tablet by mouth daily, Disp-90 tablet, R-3Normal      valsartan (DIOVAN) 80 MG tablet Take 1 tablet by mouth daily, Disp-90 tablet, R-3Normal      Lancets MISC Historical Med      clobetasol (TEMOVATE) 0.05 % cream APPLY TOPICALLY TO AFFECTED AREA TWO TIMES A DAY, Historical Med             ALLERGIES     Patient has no known allergies.    FAMILY HISTORY       Family History   Problem Relation Age of Onset    No Known Problems Mother           SOCIAL HISTORY       Social History     Socioeconomic History    Marital status: Single   Tobacco Use    Smoking status: Former     Current packs/day: 0.00     Types: Cigarettes     Quit date: 1990     Years since quittin.4    Smokeless tobacco: Never   Substance and Sexual Activity    Alcohol use: Yes     Alcohol/week: 0.0 standard drinks of alcohol    Drug use: No     Social Determinants of Health     Financial Resource Strain: Low Risk  (3/26/2024)    Overall Financial Resource Strain (CARDIA)     Difficulty of Paying Living Expenses: Not hard at all   Food Insecurity: No Food Insecurity (3/26/2024)    Hunger Vital Sign     Worried About Running Out of Food in the Last Year: Never true     Ran Out of Food in the Last Year: Never true   Transportation Needs: Unknown (3/26/2024)    PRAPARE - Transportation     Lack of Transportation (Non-Medical): No   Housing Stability: Unknown (3/26/2024)    Housing Stability Vital Sign     Unstable Housing in the Last Year: No           PHYSICAL EXAM       ED Triage Vitals [24 1251]   BP Temp Temp Source Pulse Respirations SpO2 Height Weight - Scale   (!) 147/103 98.9 °F (37.2 °C) Oral 75 16 97 % 1.753 m (5' 9\") 99.8 kg (220 lb)       Body mass index is 32.49 kg/m².    Physical Exam    Constitutional: Alert, well-nourished, in no acute distress  Eye: normal conjunctiva, visually fixates and follows  HENT: Normocephalic, nares clear, mucosa moist, upper and lower lip with  nonspecific erythematous rash over face and in some areas looks like hives, however also has some areas with yellow crusting over forehead and concerning for possible small vesicles at some point.  Does not appear consistent with cellulitis.  Differential based on exam concerning primarily for idiopathic angioedema, allergic angioedema, bullous pemphigoid, pemphigus vulgaris, drug reaction including dress, Ornelas-Christopher syndrome, or angioedema secondary to ACE inhibitors. Will obtain basic labs and plan for admission for angioedema.               REASSESSMENT   MEDICATIONS GIVEN:   Medications   diphenhydrAMINE (BENADRYL) injection 50 mg (50 mg IntraVENous Given 5/25/24 1444)   famotidine (PEPCID) 20 mg in sodium chloride (PF) 0.9 % 10 mL injection (20 mg IntraVENous Given 5/25/24 1443)   methylPREDNISolone sodium (PF) (SOLU-MEDROL PF) injection 125 mg (125 mg IntraVENous Given 5/25/24 1444)       ED Course as of 05/25/24 1915   Sat May 25, 2024   1726 Patient reports he is feeling better and that the swelling and rash has already improved.  Declining admission for concern for developing angioedema and possible progressive symptoms.  Will discharge home with continued prednisone, as needed Benadryl, and return precautions. [LT]      ED Course User Index  [LT] Carin Marie MD           CONSULTS:  None    PROCEDURES:  Unless otherwise noted below, none     Procedures      FINAL IMPRESSION      1. Swelling of both lips    2. Rash and other nonspecific skin eruption          DISPOSITION/PLAN   DISPOSITION Decision To Discharge 05/25/2024 05:26:52 PM    Discharge home with outpatient follow up with PCP      PLAN    PATIENT REFERRED TO:   No follow-up provider specified.  DISCHARGE MEDICATIONS:  Discharge Medication List as of 5/25/2024  5:29 PM          (Please note that portions of this note were completed with a voice recognition program.  Efforts were made to edit the dictations but occasionally words are

## 2024-05-25 NOTE — ED TRIAGE NOTES
Patient arrives to ED with c/o lip swelling and forehead swelling that has been worsening over the past three days. VSS. Patient denies shortness of breath/difficulty breathing.

## 2024-05-25 NOTE — DISCHARGE INSTRUCTIONS
As we discussed, call to schedule follow-up with your primary care physician as soon as possible.  Please return to the emergency department if you experience fevers, increasing area of redness or swelling on your skin, swelling of your tongue or throat, inability to swallow, inability to tolerate drinking fluids, or any other new and concerning symptoms.

## 2024-05-28 ENCOUNTER — TELEPHONE (OUTPATIENT)
Age: 59
End: 2024-05-28

## 2024-05-29 ENCOUNTER — TELEMEDICINE (OUTPATIENT)
Age: 59
End: 2024-05-29
Payer: COMMERCIAL

## 2024-05-29 DIAGNOSIS — L25.9 CONTACT DERMATITIS, UNSPECIFIED CONTACT DERMATITIS TYPE, UNSPECIFIED TRIGGER: Primary | ICD-10-CM

## 2024-05-29 PROCEDURE — 99214 OFFICE O/P EST MOD 30 MIN: CPT | Performed by: NURSE PRACTITIONER

## 2024-05-29 RX ORDER — PREDNISONE 10 MG/1
TABLET ORAL
Qty: 1 EACH | Refills: 0 | Status: SHIPPED | OUTPATIENT
Start: 2024-05-29

## 2024-05-29 NOTE — PROGRESS NOTES
Chief Complaint   Patient presents with    Follow-Up from Hospital     ED f/u     Patient is on virtual today for a ED f/u.  Patient stated his lips are still swollen and has hives.  Patient stated that it started last Wednesday, when he woke up with swollen lips and hives.  No other concerns.    Patient stated he is in the [facility] at the time of this virtual visit.      \"Have you been to the ER, urgent care clinic since your last visit?  Hospitalized since your last visit?\"    YES - When: approximately 5/25 ago.  Where and Why: swollen lips and hives.    “Have you seen or consulted any other health care providers outside of [facility] since your last visit?”    NO        “Have you had a colorectal cancer screening such as a colonoscopy/FIT/Cologuard?    NO    Date of last Colonoscopy: 4/20/2005  No cologuard on file  No FIT/FOBT on file   No flexible sigmoidoscopy on file         Click Here for Release of Records Request

## 2024-05-29 NOTE — PROGRESS NOTES
Edmund Noguera, was evaluated through a synchronous (real-time) audio-video encounter. The patient (or guardian if applicable) is aware that this is a billable service, which includes applicable co-pays. This Virtual Visit was conducted with patient's (and/or legal guardian's) consent. Patient identification was verified, and a caregiver was present when appropriate.   The patient was located at Home: [address] [place] [id number]  Provider was located at Home [place]Appt [place] State[place]  Confirm you are appropriately licensed, registered, or certified to deliver care in the state where the patient is located as indicated above. If you are not or unsure, please re-schedule the visit: Yes, I confirm.     Edmund Noguera (:  1965) is a Established patient, presenting virtually for evaluation of the following:    Assessment & Plan   Below is the assessment and plan developed based on review of pertinent history, physical exam, labs, studies, and medications.  1. Contact dermatitis, unspecified contact dermatitis type, unspecified trigger  -     predniSONE 10 MG (21) TBPK; As directed on package - 6 days, Disp-1 each, R-0Normal    Given that the rash is getting worse put on pred pack and needs to cont the benadryl 50mg prn  Reviewed potential triggers  Follow up 2-3 days if not improving    No follow-ups on file.       Subjective   HPI  Patient is on virtual today for a ED f/u - got IV pred infusion, helped x 24 hours only  Patient stated his lips are still swollen and has hives.  Patient stated that it started last Wednesday, when he woke up with swollen lips and hives.  Large hives and rash of the left side of the face that is worsening and now around the eye  Also has patches of the arms  Unknown trigger    Review of Systems   A comprehensive review of system was obtained and negative except findings in the HPI      Objective   Patient-Reported Vitals  No data recorded     Physical Exam  [INSTRUCTIONS:

## 2024-07-11 ENCOUNTER — TELEPHONE (OUTPATIENT)
Age: 59
End: 2024-07-11

## 2024-07-11 DIAGNOSIS — E11.9 TYPE 2 DIABETES MELLITUS WITHOUT COMPLICATION, WITHOUT LONG-TERM CURRENT USE OF INSULIN (HCC): ICD-10-CM

## 2024-07-11 NOTE — TELEPHONE ENCOUNTER
We received a fax refill request for Edmund Noguera.  Please escribe Invokana to their pharmacy.  The pharmacy is correct in the chart and they are requesting a ? day supply.

## 2024-07-23 DIAGNOSIS — E11.9 TYPE 2 DIABETES MELLITUS WITHOUT COMPLICATION, WITHOUT LONG-TERM CURRENT USE OF INSULIN (HCC): ICD-10-CM

## 2024-09-03 DIAGNOSIS — E11.9 TYPE 2 DIABETES MELLITUS WITHOUT COMPLICATION, WITHOUT LONG-TERM CURRENT USE OF INSULIN (HCC): ICD-10-CM

## 2024-09-04 ENCOUNTER — CLINICAL DOCUMENTATION (OUTPATIENT)
Age: 59
End: 2024-09-04

## 2024-10-31 ENCOUNTER — OFFICE VISIT (OUTPATIENT)
Age: 59
End: 2024-10-31

## 2024-10-31 VITALS
SYSTOLIC BLOOD PRESSURE: 129 MMHG | TEMPERATURE: 97.9 F | DIASTOLIC BLOOD PRESSURE: 80 MMHG | WEIGHT: 216 LBS | BODY MASS INDEX: 31.99 KG/M2 | RESPIRATION RATE: 16 BRPM | HEART RATE: 61 BPM | OXYGEN SATURATION: 99 % | HEIGHT: 69 IN

## 2024-10-31 DIAGNOSIS — I10 PRIMARY HYPERTENSION: ICD-10-CM

## 2024-10-31 DIAGNOSIS — E11.9 TYPE 2 DIABETES MELLITUS WITHOUT COMPLICATION, WITHOUT LONG-TERM CURRENT USE OF INSULIN (HCC): Primary | ICD-10-CM

## 2024-10-31 DIAGNOSIS — E78.00 HYPERCHOLESTEROLEMIA: ICD-10-CM

## 2024-10-31 NOTE — PROGRESS NOTES
Chief Complaint   Patient presents with    Follow-up     Labs       Patient presents in office today fir 6 month f/u and labs.  No concerns.        \"Have you been to the ER, urgent care clinic since your last visit?  Hospitalized since your last visit?\"    NO    “Have you seen or consulted any other health care providers outside our system since your last visit?”    NO      “Have you had a colorectal cancer screening such as a colonoscopy/FIT/Cologuard?    NO    Date of last Colonoscopy: 4/20/2005  No cologuard on file  No FIT/FOBT on file   No flexible sigmoidoscopy on file     “Have you had a diabetic eye exam?”    NO     No diabetic eye exam on file          
his work schedule and an early morning dental appointment. He has not consumed any food since then.    Review of Systems   A comprehensive review of system was obtained and negative except findings in the HPI      Objective   Blood pressure 129/80, pulse 61, temperature 97.9 °F (36.6 °C), temperature source Oral, resp. rate 16, height 1.753 m (5' 9\"), weight 98 kg (216 lb), SpO2 99%.  Physical Exam  Physical Examination:   GENERAL ASSESSMENT: well developed and well nourished  CHEST: normal air exchange, no rales, no rhonchi, no wheezes, respiratory effort normal with no retractions  HEART: regular rate and rhythm, normal S1/S2, no murmurs             The patient (or guardian, if applicable) and other individuals in attendance with the patient were advised that Artificial Intelligence will be utilized during this visit to record, process the conversation to generate a clinical note and to support improvement of the AI technology. The patient (or guardian, if applicable) and other individuals in attendance at the appointment consented to the use of AI, including the recording.      An electronic signature was used to authenticate this note.    --AMY Morrison - ROCIO

## 2024-11-01 LAB
ALBUMIN SERPL-MCNC: 4 G/DL (ref 3.5–5)
ALBUMIN/GLOB SERPL: 1 (ref 1.1–2.2)
ALP SERPL-CCNC: 77 U/L (ref 45–117)
ALT SERPL-CCNC: 44 U/L (ref 12–78)
ANION GAP SERPL CALC-SCNC: 10 MMOL/L (ref 2–12)
AST SERPL-CCNC: 31 U/L (ref 15–37)
BILIRUB SERPL-MCNC: 0.4 MG/DL (ref 0.2–1)
BUN SERPL-MCNC: 21 MG/DL (ref 6–20)
BUN/CREAT SERPL: 20 (ref 12–20)
CALCIUM SERPL-MCNC: 9.1 MG/DL (ref 8.5–10.1)
CHLORIDE SERPL-SCNC: 106 MMOL/L (ref 97–108)
CHOLEST SERPL-MCNC: 124 MG/DL
CO2 SERPL-SCNC: 24 MMOL/L (ref 21–32)
CREAT SERPL-MCNC: 1.05 MG/DL (ref 0.7–1.3)
EST. AVERAGE GLUCOSE BLD GHB EST-MCNC: 117 MG/DL
GLOBULIN SER CALC-MCNC: 4.2 G/DL (ref 2–4)
GLUCOSE SERPL-MCNC: 63 MG/DL (ref 65–100)
HBA1C MFR BLD: 5.7 % (ref 4–5.6)
HDLC SERPL-MCNC: 37 MG/DL
HDLC SERPL: 3.4 (ref 0–5)
LDLC SERPL CALC-MCNC: 57.8 MG/DL (ref 0–100)
POTASSIUM SERPL-SCNC: 4 MMOL/L (ref 3.5–5.1)
PROT SERPL-MCNC: 8.2 G/DL (ref 6.4–8.2)
SODIUM SERPL-SCNC: 140 MMOL/L (ref 136–145)
TRIGL SERPL-MCNC: 146 MG/DL
VLDLC SERPL CALC-MCNC: 29.2 MG/DL

## 2025-01-09 ENCOUNTER — COMMUNITY OUTREACH (OUTPATIENT)
Facility: CLINIC | Age: 60
End: 2025-01-09

## 2025-03-16 DIAGNOSIS — I10 PRIMARY HYPERTENSION: ICD-10-CM

## 2025-03-17 RX ORDER — VALSARTAN 80 MG/1
80 TABLET ORAL DAILY
Qty: 90 TABLET | Refills: 3 | Status: SHIPPED | OUTPATIENT
Start: 2025-03-17

## 2025-03-31 RX ORDER — ROSUVASTATIN CALCIUM 5 MG/1
5 TABLET, COATED ORAL NIGHTLY
Qty: 90 TABLET | Refills: 3 | Status: SHIPPED | OUTPATIENT
Start: 2025-03-31

## 2025-04-04 ENCOUNTER — TELEPHONE (OUTPATIENT)
Facility: CLINIC | Age: 60
End: 2025-04-04

## 2025-04-04 NOTE — TELEPHONE ENCOUNTER
----- Message from Mu TAMEZ sent at 4/4/2025  3:29 PM EDT -----  Regarding: E[facility] Appointment Request  E[facility] Appointment Request    Patient needs appointment for E[facility] Appointment Type: Existing Condition Follow Up.    Patient Requested Dates(s): April 14-18, 2025  Patient Requested Time: any time   Provider Name: Lillian Ramey APRN - NP    Reason for Appointment Request: Established Patient - Available appointments did not meet patient need  --------------------------------------------------------------------------------------------------------------------------    Relationship to Patient: Self     Call Back Information: OK to leave message on voicemail  Preferred Call Back Number: Phone [phone]

## 2025-04-21 NOTE — PROGRESS NOTES
HISTORY OF PRESENT ILLNESS  Vania Benavides is a 53 y.o. male. HPI  Patient is here for testosterone injection  He is also due for recheck labs for this as well  Does 400mg IM monthly    Also due for A1c and chol check  Does not check his sugars at home, does not really watch the diet    Patient is here today for flu shot. Denies any previous adr/se to the flu shot, no egg allergy and no recent illness or fever. ROS  A comprehensive review of system was obtained and negative except findings in the HPI    Visit Vitals  /79 (BP 1 Location: Left arm, BP Patient Position: Sitting)   Pulse 68   Temp 98.5 °F (36.9 °C) (Oral)   Resp 16   Ht 5' 9\" (1.753 m)   Wt 221 lb (100.2 kg)   SpO2 96%   BMI 32.64 kg/m²     Physical Exam   Constitutional: He is oriented to person, place, and time. He appears well-developed and well-nourished. No distress. Cardiovascular: Normal rate and regular rhythm. No murmur heard. Pulmonary/Chest: Breath sounds normal. No respiratory distress. He has no wheezes. Musculoskeletal: He exhibits no edema. Neurological: He is alert and oriented to person, place, and time. Nursing note and vitals reviewed. ASSESSMENT and PLAN  Encounter Diagnoses   Name Primary?     Hypogonadism male Yes    Type 2 diabetes mellitus without complication, without long-term current use of insulin (HCC)     Hypertriglyceridemia     Encounter for immunization      Orders Placed This Encounter    Influenza virus vaccine (QUADRIVALENT PRES FREE SYRINGE) IM (15968)    HEMOGLOBIN A1C WITH EAG    PROSTATE SPECIFIC AG    LIPID PANEL    CBC WITH AUTOMATED DIFF    METABOLIC PANEL, COMPREHENSIVE    TESTOSTERONE, FREE & TOTAL    testosterone cypionate (DEPOTESTOTERONE CYPIONATE) 200 mg/mL injection    canagliflozin (INVOKANA) 300 mg tablet     Refills updated  Labs updated as well  Testosterone injection 400mg IM given  Flu shot given as well    I have discussed the diagnosis with the patient and the intended plan as seen in the above orders. The patient has received an after-visit summary and questions were answered concerning future plans. Patient conveyed understanding of the plan at the time of the visit.     Agustina Andersen MSN, ANP  10/2/2019 Initial (On Arrival)

## 2025-04-29 DIAGNOSIS — E78.00 HYPERCHOLESTEROLEMIA: ICD-10-CM

## 2025-04-29 RX ORDER — FENOFIBRATE 145 MG/1
145 TABLET, COATED ORAL DAILY
Qty: 90 TABLET | Refills: 3 | Status: SHIPPED | OUTPATIENT
Start: 2025-04-29

## 2025-05-25 DIAGNOSIS — E11.9 TYPE 2 DIABETES MELLITUS WITHOUT COMPLICATION, WITHOUT LONG-TERM CURRENT USE OF INSULIN (HCC): ICD-10-CM

## 2025-05-27 RX ORDER — GLIMEPIRIDE 4 MG/1
4 TABLET ORAL EVERY MORNING
Qty: 90 TABLET | Refills: 3 | Status: SHIPPED | OUTPATIENT
Start: 2025-05-27

## 2025-06-10 DIAGNOSIS — E11.9 TYPE 2 DIABETES MELLITUS WITHOUT COMPLICATION, WITHOUT LONG-TERM CURRENT USE OF INSULIN (HCC): ICD-10-CM

## 2025-06-10 RX ORDER — METFORMIN HYDROCHLORIDE 500 MG/1
TABLET, EXTENDED RELEASE ORAL
Qty: 360 TABLET | Refills: 3 | Status: SHIPPED | OUTPATIENT
Start: 2025-06-10

## 2025-07-01 ENCOUNTER — OFFICE VISIT (OUTPATIENT)
Facility: CLINIC | Age: 60
End: 2025-07-01
Payer: COMMERCIAL

## 2025-07-01 VITALS
TEMPERATURE: 98.2 F | HEIGHT: 69 IN | SYSTOLIC BLOOD PRESSURE: 120 MMHG | WEIGHT: 222.2 LBS | HEART RATE: 86 BPM | BODY MASS INDEX: 32.91 KG/M2 | OXYGEN SATURATION: 97 % | DIASTOLIC BLOOD PRESSURE: 76 MMHG

## 2025-07-01 DIAGNOSIS — E78.00 HYPERCHOLESTEROLEMIA: ICD-10-CM

## 2025-07-01 DIAGNOSIS — Z12.5 SCREENING PSA (PROSTATE SPECIFIC ANTIGEN): ICD-10-CM

## 2025-07-01 DIAGNOSIS — Z00.00 WELL EXAM WITHOUT ABNORMAL FINDINGS OF PATIENT 18 YEARS OF AGE OR OLDER: Primary | ICD-10-CM

## 2025-07-01 DIAGNOSIS — I10 PRIMARY HYPERTENSION: ICD-10-CM

## 2025-07-01 DIAGNOSIS — E11.9 TYPE 2 DIABETES MELLITUS WITHOUT COMPLICATION, WITHOUT LONG-TERM CURRENT USE OF INSULIN (HCC): ICD-10-CM

## 2025-07-01 PROCEDURE — 99214 OFFICE O/P EST MOD 30 MIN: CPT | Performed by: NURSE PRACTITIONER

## 2025-07-01 PROCEDURE — 99396 PREV VISIT EST AGE 40-64: CPT | Performed by: NURSE PRACTITIONER

## 2025-07-01 PROCEDURE — 3078F DIAST BP <80 MM HG: CPT | Performed by: NURSE PRACTITIONER

## 2025-07-01 PROCEDURE — 3074F SYST BP LT 130 MM HG: CPT | Performed by: NURSE PRACTITIONER

## 2025-07-01 RX ORDER — CLOBETASOL PROPIONATE 0.5 MG/G
CREAM TOPICAL
Qty: 45 G | Refills: 1 | Status: SHIPPED | OUTPATIENT
Start: 2025-07-01

## 2025-07-01 SDOH — ECONOMIC STABILITY: FOOD INSECURITY: WITHIN THE PAST 12 MONTHS, THE FOOD YOU BOUGHT JUST DIDN'T LAST AND YOU DIDN'T HAVE MONEY TO GET MORE.: NEVER TRUE

## 2025-07-01 SDOH — ECONOMIC STABILITY: FOOD INSECURITY: WITHIN THE PAST 12 MONTHS, YOU WORRIED THAT YOUR FOOD WOULD RUN OUT BEFORE YOU GOT MONEY TO BUY MORE.: NEVER TRUE

## 2025-07-01 ASSESSMENT — PATIENT HEALTH QUESTIONNAIRE - PHQ9
1. LITTLE INTEREST OR PLEASURE IN DOING THINGS: NOT AT ALL
SUM OF ALL RESPONSES TO PHQ QUESTIONS 1-9: 0
2. FEELING DOWN, DEPRESSED OR HOPELESS: NOT AT ALL
SUM OF ALL RESPONSES TO PHQ QUESTIONS 1-9: 0

## 2025-07-01 NOTE — PROGRESS NOTES
Chief Complaint   Patient presents with    Follow-up Chronic Condition     \"Have you been to the ER, urgent care clinic since your last visit?  Hospitalized since your last visit?\"    NO    “Have you seen or consulted any other health care providers outside of [facility] since your last visit?”    NO     /76 (BP Site: Left Upper Arm, Patient Position: Sitting)   Pulse 86   Temp 98.2 °F (36.8 °C) (Temporal)   Ht 1.753 m (5' 9\")   Wt 100.8 kg (222 lb 3.2 oz)   SpO2 97%   BMI 32.81 kg/m²      PHQ-9 Total Score: 0 (2025 10:57 AM)           No questionnaires available.                          “Have you had a colorectal cancer screening such as a colonoscopy/FIT/Cologuard?    NO    No colonoscopy on file  No cologuard on file  No FIT/FOBT on file   No flexible sigmoidoscopy on file     “Have you had a diabetic eye exam?”    YES - Where: [place] saw in 2024 Nurse/CMA to request most recent records if not in the chart     No diabetic eye exam on file           Click Here for Release of Records Request     Identified Patient with 2 Patient Identifiers-Name and

## 2025-07-02 ENCOUNTER — LAB (OUTPATIENT)
Facility: CLINIC | Age: 60
End: 2025-07-02

## 2025-07-02 LAB
ALBUMIN SERPL-MCNC: 4.7 G/DL (ref 3.9–4.9)
ALP SERPL-CCNC: 71 IU/L (ref 44–121)
ALT SERPL-CCNC: 32 IU/L (ref 0–44)
AST SERPL-CCNC: 31 IU/L (ref 0–40)
BASOPHILS # BLD AUTO: 0.1 X10E3/UL (ref 0–0.2)
BASOPHILS NFR BLD AUTO: 1 %
BILIRUB SERPL-MCNC: 0.5 MG/DL (ref 0–1.2)
BUN SERPL-MCNC: 13 MG/DL (ref 8–27)
BUN/CREAT SERPL: 14 (ref 10–24)
CALCIUM SERPL-MCNC: 9.6 MG/DL (ref 8.6–10.2)
CHLORIDE SERPL-SCNC: 104 MMOL/L (ref 96–106)
CHOLEST SERPL-MCNC: 137 MG/DL (ref 100–199)
CO2 SERPL-SCNC: 20 MMOL/L (ref 20–29)
CREAT SERPL-MCNC: 0.96 MG/DL (ref 0.76–1.27)
EGFRCR SERPLBLD CKD-EPI 2021: 90 ML/MIN/1.73
EOSINOPHIL # BLD AUTO: 0.2 X10E3/UL (ref 0–0.4)
EOSINOPHIL NFR BLD AUTO: 4 %
ERYTHROCYTE [DISTWIDTH] IN BLOOD BY AUTOMATED COUNT: 13 % (ref 11.6–15.4)
GLOBULIN SER CALC-MCNC: 3.2 G/DL (ref 1.5–4.5)
GLUCOSE SERPL-MCNC: 78 MG/DL (ref 70–99)
HCT VFR BLD AUTO: 47.5 % (ref 37.5–51)
HDLC SERPL-MCNC: 37 MG/DL
HGB BLD-MCNC: 15.2 G/DL (ref 13–17.7)
IMM GRANULOCYTES # BLD AUTO: 0 X10E3/UL (ref 0–0.1)
IMM GRANULOCYTES NFR BLD AUTO: 0 %
LDLC SERPL CALC-MCNC: 77 MG/DL (ref 0–99)
LYMPHOCYTES # BLD AUTO: 1.4 X10E3/UL (ref 0.7–3.1)
LYMPHOCYTES NFR BLD AUTO: 27 %
MCH RBC QN AUTO: 29.7 PG (ref 26.6–33)
MCHC RBC AUTO-ENTMCNC: 32 G/DL (ref 31.5–35.7)
MCV RBC AUTO: 93 FL (ref 79–97)
MONOCYTES # BLD AUTO: 0.4 X10E3/UL (ref 0.1–0.9)
MONOCYTES NFR BLD AUTO: 9 %
NEUTROPHILS # BLD AUTO: 3 X10E3/UL (ref 1.4–7)
NEUTROPHILS NFR BLD AUTO: 59 %
PLATELET # BLD AUTO: 216 X10E3/UL (ref 150–450)
POTASSIUM SERPL-SCNC: 4.4 MMOL/L (ref 3.5–5.2)
PROT SERPL-MCNC: 7.9 G/DL (ref 6–8.5)
PSA SERPL-MCNC: 0.8 NG/ML (ref 0–4)
RBC # BLD AUTO: 5.11 X10E6/UL (ref 4.14–5.8)
SODIUM SERPL-SCNC: 142 MMOL/L (ref 134–144)
TRIGL SERPL-MCNC: 126 MG/DL (ref 0–149)
TSH SERPL DL<=0.005 MIU/L-ACNC: 1.78 UIU/ML (ref 0.45–4.5)
VLDLC SERPL CALC-MCNC: 23 MG/DL (ref 5–40)
WBC # BLD AUTO: 5 X10E3/UL (ref 3.4–10.8)

## 2025-07-03 LAB
ALBUMIN/CREAT UR: <7 MG/G CREAT (ref 0–29)
CREAT UR-MCNC: 43.1 MG/DL
MICROALBUMIN UR-MCNC: <3 UG/ML

## 2025-07-03 NOTE — PROGRESS NOTES
2025    Edmund Noguera (:  1965) is a 60 y.o. male, here for a preventive medicine evaluation.  He is managed for dm, chol, htn and low T.  Feeling good overall, no complaints, refills are up to date.      Subjective   Patient Active Problem List   Diagnosis    JASE (obstructive sleep apnea)    Hypertriglyceridemia    Hypogonadism male    Allergy desensitization therapy    Diabetes mellitus (HCC)    Primary hypertension    Hypercholesterolemia       Review of Systems  A comprehensive review of system was obtained and negative except findings in the HPI    Prior to Visit Medications    Medication Sig Taking? Authorizing Provider   clobetasol (TEMOVATE) 0.05 % cream Apply topically 2 times daily. Yes Lillian Ramey APRN - NP   metFORMIN (GLUCOPHAGE-XR) 500 MG extended release tablet TAKE 4 TABLETS BY MOUTH DAILY (WITH DINNER). Yes Lillian Ramey APRN - NP   glimepiride (AMARYL) 4 MG tablet TAKE 1 TABLET BY MOUTH EVERY DAY IN THE MORNING Yes Lillian Ramey APRN - NP   fenofibrate (TRICOR) 145 MG tablet TAKE 1 TABLET BY MOUTH EVERY DAY Yes Lillian Ramey APRN - NP   rosuvastatin (CRESTOR) 5 MG tablet TAKE 1 TABLET BY MOUTH EVERY DAY AT NIGHT Yes Lillian Ramey APRN - NP   valsartan (DIOVAN) 80 MG tablet TAKE 1 TABLET BY MOUTH EVERY DAY Yes Lillian Ramey APRN - NP   canagliflozin (INVOKANA) 300 MG TABS tablet Take 1 tablet by mouth daily Yes Lillian Ramey APRN - NP   Lancets MISC Bid monitoring dx: E11.9  Patient not taking: Reported on 2025  Automatic Reconciliation, Ar        No Known Allergies    Past Medical History:   Diagnosis Date    Diabetes (HCC)        Past Surgical History:   Procedure Laterality Date    APPENDECTOMY      ORTHOPEDIC SURGERY         Social History     Socioeconomic History    Marital status: Single     Spouse name: Not on file    Number of children: Not on file    Years of education: Not on file    Highest education level: Not on file

## 2025-07-07 ENCOUNTER — RESULTS FOLLOW-UP (OUTPATIENT)
Facility: CLINIC | Age: 60
End: 2025-07-07

## 2025-07-08 LAB
EST. AVERAGE GLUCOSE BLD GHB EST-MCNC: 128 MG/DL
HBA1C MFR BLD: 6.1 %HB

## 2025-08-25 ENCOUNTER — TELEPHONE (OUTPATIENT)
Facility: CLINIC | Age: 60
End: 2025-08-25

## 2025-08-25 DIAGNOSIS — E11.9 TYPE 2 DIABETES MELLITUS WITHOUT COMPLICATION, WITHOUT LONG-TERM CURRENT USE OF INSULIN (HCC): ICD-10-CM
